# Patient Record
Sex: FEMALE | Race: WHITE | Employment: FULL TIME | URBAN - METROPOLITAN AREA
[De-identification: names, ages, dates, MRNs, and addresses within clinical notes are randomized per-mention and may not be internally consistent; named-entity substitution may affect disease eponyms.]

---

## 2018-08-17 ENCOUNTER — HOSPITAL ENCOUNTER (EMERGENCY)
Age: 63
Discharge: ANOTHER HOSPITAL | End: 2018-08-17
Attending: EMERGENCY MEDICINE
Payer: COMMERCIAL

## 2018-08-17 ENCOUNTER — APPOINTMENT (OUTPATIENT)
Dept: GENERAL RADIOLOGY | Age: 63
End: 2018-08-17
Payer: COMMERCIAL

## 2018-08-17 VITALS
BODY MASS INDEX: 25.58 KG/M2 | TEMPERATURE: 97.9 F | HEIGHT: 67 IN | OXYGEN SATURATION: 97 % | SYSTOLIC BLOOD PRESSURE: 116 MMHG | HEART RATE: 52 BPM | WEIGHT: 163 LBS | DIASTOLIC BLOOD PRESSURE: 80 MMHG | RESPIRATION RATE: 14 BRPM

## 2018-08-17 DIAGNOSIS — I20.0 UNSTABLE ANGINA (HCC): Primary | ICD-10-CM

## 2018-08-17 LAB
A/G RATIO: 1.8 (ref 1.1–2.2)
ALBUMIN SERPL-MCNC: 3.9 G/DL (ref 3.4–5)
ALP BLD-CCNC: 41 U/L (ref 40–129)
ALT SERPL-CCNC: 13 U/L (ref 10–40)
ANION GAP SERPL CALCULATED.3IONS-SCNC: 15 MMOL/L (ref 3–16)
AST SERPL-CCNC: 12 U/L (ref 15–37)
BASOPHILS ABSOLUTE: 0.1 K/UL (ref 0–0.2)
BASOPHILS RELATIVE PERCENT: 0.9 %
BILIRUB SERPL-MCNC: 0.3 MG/DL (ref 0–1)
BILIRUBIN URINE: NEGATIVE
BLOOD, URINE: NEGATIVE
BUN BLDV-MCNC: 25 MG/DL (ref 7–20)
CALCIUM SERPL-MCNC: 9.7 MG/DL (ref 8.3–10.6)
CHLORIDE BLD-SCNC: 103 MMOL/L (ref 99–110)
CLARITY: CLEAR
CO2: 21 MMOL/L (ref 21–32)
COLOR: YELLOW
CREAT SERPL-MCNC: 1.1 MG/DL (ref 0.6–1.2)
EKG ATRIAL RATE: 58 BPM
EKG DIAGNOSIS: NORMAL
EKG P AXIS: 14 DEGREES
EKG P-R INTERVAL: 128 MS
EKG Q-T INTERVAL: 442 MS
EKG QRS DURATION: 88 MS
EKG QTC CALCULATION (BAZETT): 433 MS
EKG R AXIS: 8 DEGREES
EKG T AXIS: 46 DEGREES
EKG VENTRICULAR RATE: 58 BPM
EOSINOPHILS ABSOLUTE: 0.2 K/UL (ref 0–0.6)
EOSINOPHILS RELATIVE PERCENT: 1.4 %
GFR AFRICAN AMERICAN: >60
GFR NON-AFRICAN AMERICAN: 50
GLOBULIN: 2.2 G/DL
GLUCOSE BLD-MCNC: 112 MG/DL (ref 70–99)
GLUCOSE URINE: NEGATIVE MG/DL
HCT VFR BLD CALC: 35.7 % (ref 36–48)
HEMOGLOBIN: 12.6 G/DL (ref 12–16)
KETONES, URINE: NEGATIVE MG/DL
LEUKOCYTE ESTERASE, URINE: NEGATIVE
LYMPHOCYTES ABSOLUTE: 3.2 K/UL (ref 1–5.1)
LYMPHOCYTES RELATIVE PERCENT: 28.8 %
MAGNESIUM: 1.7 MG/DL (ref 1.8–2.4)
MCH RBC QN AUTO: 32.4 PG (ref 26–34)
MCHC RBC AUTO-ENTMCNC: 35.3 G/DL (ref 31–36)
MCV RBC AUTO: 91.8 FL (ref 80–100)
MICROSCOPIC EXAMINATION: NORMAL
MONOCYTES ABSOLUTE: 0.6 K/UL (ref 0–1.3)
MONOCYTES RELATIVE PERCENT: 5.6 %
NEUTROPHILS ABSOLUTE: 7 K/UL (ref 1.7–7.7)
NEUTROPHILS RELATIVE PERCENT: 63.3 %
NITRITE, URINE: NEGATIVE
PDW BLD-RTO: 15 % (ref 12.4–15.4)
PH UA: 5.5
PLATELET # BLD: 173 K/UL (ref 135–450)
PMV BLD AUTO: 8.1 FL (ref 5–10.5)
POTASSIUM REFLEX MAGNESIUM: 3.4 MMOL/L (ref 3.5–5.1)
PRO-BNP: 669 PG/ML (ref 0–124)
PROTEIN UA: NEGATIVE MG/DL
RBC # BLD: 3.89 M/UL (ref 4–5.2)
SODIUM BLD-SCNC: 139 MMOL/L (ref 136–145)
SPECIFIC GRAVITY UA: 1.02
TOTAL PROTEIN: 6.1 G/DL (ref 6.4–8.2)
TROPONIN: <0.01 NG/ML
TROPONIN: <0.01 NG/ML
URINE REFLEX TO CULTURE: NORMAL
URINE TYPE: NORMAL
UROBILINOGEN, URINE: 0.2 E.U./DL
WBC # BLD: 11 K/UL (ref 4–11)

## 2018-08-17 PROCEDURE — 83735 ASSAY OF MAGNESIUM: CPT

## 2018-08-17 PROCEDURE — 99285 EMERGENCY DEPT VISIT HI MDM: CPT

## 2018-08-17 PROCEDURE — 83880 ASSAY OF NATRIURETIC PEPTIDE: CPT

## 2018-08-17 PROCEDURE — 93005 ELECTROCARDIOGRAM TRACING: CPT | Performed by: PHYSICIAN ASSISTANT

## 2018-08-17 PROCEDURE — 80053 COMPREHEN METABOLIC PANEL: CPT

## 2018-08-17 PROCEDURE — 6370000000 HC RX 637 (ALT 250 FOR IP)

## 2018-08-17 PROCEDURE — 84484 ASSAY OF TROPONIN QUANT: CPT

## 2018-08-17 PROCEDURE — 93010 ELECTROCARDIOGRAM REPORT: CPT | Performed by: INTERNAL MEDICINE

## 2018-08-17 PROCEDURE — 99285 EMERGENCY DEPT VISIT HI MDM: CPT | Performed by: INTERNAL MEDICINE

## 2018-08-17 PROCEDURE — 81003 URINALYSIS AUTO W/O SCOPE: CPT

## 2018-08-17 PROCEDURE — 6360000002 HC RX W HCPCS

## 2018-08-17 PROCEDURE — 85025 COMPLETE CBC W/AUTO DIFF WBC: CPT

## 2018-08-17 PROCEDURE — 96374 THER/PROPH/DIAG INJ IV PUSH: CPT

## 2018-08-17 PROCEDURE — 71046 X-RAY EXAM CHEST 2 VIEWS: CPT

## 2018-08-17 PROCEDURE — 6370000000 HC RX 637 (ALT 250 FOR IP): Performed by: EMERGENCY MEDICINE

## 2018-08-17 PROCEDURE — 96361 HYDRATE IV INFUSION ADD-ON: CPT

## 2018-08-17 PROCEDURE — 2580000003 HC RX 258

## 2018-08-17 RX ORDER — ASPIRIN 325 MG
325 TABLET ORAL ONCE
Status: DISCONTINUED | OUTPATIENT
Start: 2018-08-17 | End: 2018-08-17

## 2018-08-17 RX ORDER — POTASSIUM CHLORIDE 750 MG/1
TABLET, FILM COATED, EXTENDED RELEASE ORAL
Status: COMPLETED
Start: 2018-08-17 | End: 2018-08-17

## 2018-08-17 RX ORDER — 0.9 % SODIUM CHLORIDE 0.9 %
1000 INTRAVENOUS SOLUTION INTRAVENOUS ONCE
Status: COMPLETED | OUTPATIENT
Start: 2018-08-17 | End: 2018-08-17

## 2018-08-17 RX ORDER — DICYCLOMINE HCL 20 MG
20 TABLET ORAL
COMMUNITY
Start: 2017-09-08

## 2018-08-17 RX ORDER — ISOSORBIDE MONONITRATE 30 MG/1
30 TABLET, EXTENDED RELEASE ORAL
COMMUNITY
Start: 2018-05-04

## 2018-08-17 RX ORDER — SODIUM CHLORIDE 9 MG/ML
INJECTION, SOLUTION INTRAVENOUS
Status: COMPLETED
Start: 2018-08-17 | End: 2018-08-17

## 2018-08-17 RX ORDER — LOSARTAN POTASSIUM AND HYDROCHLOROTHIAZIDE 12.5; 5 MG/1; MG/1
TABLET ORAL
COMMUNITY
Start: 2018-08-14

## 2018-08-17 RX ORDER — PREDNISONE 1 MG/1
8 TABLET ORAL DAILY
COMMUNITY
Start: 2018-07-12

## 2018-08-17 RX ORDER — MORPHINE SULFATE 4 MG/ML
INJECTION, SOLUTION INTRAMUSCULAR; INTRAVENOUS
Status: COMPLETED
Start: 2018-08-17 | End: 2018-08-17

## 2018-08-17 RX ORDER — MESALAMINE 1.2 G/1
3600 TABLET, DELAYED RELEASE ORAL
COMMUNITY
Start: 2017-12-18

## 2018-08-17 RX ORDER — NITROGLYCERIN 0.4 MG/1
TABLET SUBLINGUAL
COMMUNITY
Start: 2018-02-13

## 2018-08-17 RX ORDER — TRAZODONE HYDROCHLORIDE 100 MG/1
TABLET ORAL
COMMUNITY
Start: 2018-02-22

## 2018-08-17 RX ORDER — METOPROLOL SUCCINATE 25 MG/1
25 TABLET, EXTENDED RELEASE ORAL
COMMUNITY
Start: 2018-03-16

## 2018-08-17 RX ORDER — POTASSIUM CHLORIDE 750 MG/1
10 TABLET, FILM COATED, EXTENDED RELEASE ORAL ONCE
Status: COMPLETED | OUTPATIENT
Start: 2018-08-17 | End: 2018-08-17

## 2018-08-17 RX ORDER — MORPHINE SULFATE 4 MG/ML
4 INJECTION, SOLUTION INTRAMUSCULAR; INTRAVENOUS ONCE
Status: COMPLETED | OUTPATIENT
Start: 2018-08-17 | End: 2018-08-17

## 2018-08-17 RX ORDER — LOPERAMIDE HYDROCHLORIDE 2 MG/1
6 CAPSULE ORAL
COMMUNITY

## 2018-08-17 RX ADMIN — POTASSIUM CHLORIDE 10 MEQ: 750 TABLET, FILM COATED, EXTENDED RELEASE ORAL at 13:14

## 2018-08-17 RX ADMIN — TICAGRELOR 90 MG: 90 TABLET ORAL at 14:15

## 2018-08-17 RX ADMIN — NITROGLYCERIN 0.5 INCH: 20 OINTMENT TOPICAL at 12:22

## 2018-08-17 RX ADMIN — POTASSIUM CHLORIDE 10 MEQ: 750 TABLET, EXTENDED RELEASE ORAL at 13:14

## 2018-08-17 RX ADMIN — MORPHINE SULFATE 4 MG: 4 INJECTION, SOLUTION INTRAMUSCULAR; INTRAVENOUS at 12:22

## 2018-08-17 RX ADMIN — MORPHINE SULFATE 4 MG: 4 INJECTION INTRAVENOUS at 12:22

## 2018-08-17 RX ADMIN — Medication 1000 ML: at 12:21

## 2018-08-17 RX ADMIN — SODIUM CHLORIDE 1000 ML: 9 INJECTION, SOLUTION INTRAVENOUS at 12:21

## 2018-08-17 ASSESSMENT — PAIN SCALES - GENERAL
PAINLEVEL_OUTOF10: 7
PAINLEVEL_OUTOF10: 0
PAINLEVEL_OUTOF10: 7

## 2018-08-17 ASSESSMENT — PAIN DESCRIPTION - LOCATION: LOCATION: CHEST

## 2018-08-17 ASSESSMENT — PAIN DESCRIPTION - ORIENTATION: ORIENTATION: MID

## 2018-08-17 ASSESSMENT — PAIN DESCRIPTION - PAIN TYPE: TYPE: ACUTE PAIN

## 2018-08-17 ASSESSMENT — PAIN DESCRIPTION - DESCRIPTORS: DESCRIPTORS: PRESSURE

## 2018-08-17 NOTE — ED NOTES
Report called to ALLIE Sandoval on [facility] at [facility]. Questions/concerns addressed at this time. VS as noted. Report given to transporting ambulance. Pt remains pain free at this time. Pt denies any questions/concerns at this time. Chart sent with transporting crew at this time.       Zelda Ferreira RN  08/17/18 9945

## 2018-08-17 NOTE — ED PROVIDER NOTES
reflex Magnesium 3.4 (L) 3.5 - 5.1 mmol/L    Chloride 103 99 - 110 mmol/L    CO2 21 21 - 32 mmol/L    Anion Gap 15 3 - 16    Glucose 112 (H) 70 - 99 mg/dL    BUN 25 (H) 7 - 20 mg/dL    CREATININE 1.1 0.6 - 1.2 mg/dL    GFR Non-African American 50 (A) >60    GFR African American >60 >60    Calcium 9.7 8.3 - 10.6 mg/dL    Total Protein 6.1 (L) 6.4 - 8.2 g/dL    Alb 3.9 3.4 - 5.0 g/dL    Albumin/Globulin Ratio 1.8 1.1 - 2.2    Total Bilirubin 0.3 0.0 - 1.0 mg/dL    Alkaline Phosphatase 41 40 - 129 U/L    ALT 13 10 - 40 U/L    AST 12 (L) 15 - 37 U/L    Globulin 2.2 g/dL   Troponin   Result Value Ref Range    Troponin <0.01 <0.01 ng/mL   Brain natriuretic peptide   Result Value Ref Range    Pro- (H) 0 - 124 pg/mL   Urine Reflex to Culture   Result Value Ref Range    Color, UA Yellow Straw/Yellow    Clarity, UA Clear Clear    Glucose, Ur Negative Negative mg/dL    Bilirubin Urine Negative Negative    Ketones, Urine Negative Negative mg/dL    Specific Gravity, UA 1.020 1.005 - 1.030    Blood, Urine Negative Negative    pH, UA 5.5 5.0 - 8.0    Protein, UA Negative Negative mg/dL    Urobilinogen, Urine 0.2 <2.0 E.U./dL    Nitrite, Urine Negative Negative    Leukocyte Esterase, Urine Negative Negative    Microscopic Examination Not Indicated     Urine Reflex to Culture Not Indicated     Urine Type Not Specified    Magnesium   Result Value Ref Range    Magnesium 1.70 (L) 1.80 - 2.40 mg/dL   Troponin   Result Value Ref Range    Troponin <0.01 <0.01 ng/mL   EKG 12 Lead   Result Value Ref Range    Ventricular Rate 58 BPM    Atrial Rate 58 BPM    P-R Interval 128 ms    QRS Duration 88 ms    Q-T Interval 442 ms    QTc Calculation (Bazett) 433 ms    P Axis 14 degrees    R Axis 8 degrees    T Axis 46 degrees    Diagnosis       Sinus bradycardiaNonspecific ST abnormalityAbnormal ECGConfirmed by Reynaldo Verduzco MD, Anais Wiseman ([id number]) on 8/17/2018 6:04:40 PM       Imaging:   All Radiology results interpreted by Radiologist unless examination  This patient has remained hemodynamically stable during their ED course. Plan   Transfer to [address] [place] [address] to telemetry, at pt's request, due to insurance. Patient condition is stable. New Medications     Discharge Medication List as of 8/17/2018  6:20 PM        Electronically signed by Orlando Bello PA-C   DD: 8/17/18  **This report was transcribed using voice recognition software. Every effort was made to ensure accuracy; however, inadvertent computerized transcription errors may be present.   END OF PROVIDER NOTE      Orlando Bello PA-C  08/17/18 0559

## 2018-08-17 NOTE — CONSULTS
[address]                                   CONSULTATION    PATIENT NAME: Brice Magdaleno                    :        1955  MED REC NO:   [id number]                          ROOM:       21  ACCOUNT NO:   [de-identified]                           ADMIT DATE: 2018  PROVIDER:     Britni Hinkle MD    CONSULT DATE:  2018    REASON FOR CONSULTATION:  Chest pain. HISTORY OF PRESENT ILLNESS:  A 34-year-old female patient with coronary  artery disease, presented to the hospital with complaints of chest pain. She was driving a car when she had sudden onset of chest pain, felt a  tightness in the center of her chest, radiated up into her neck and her  jaw. It lasted for almost an hour. She got partial relief after taking  three sublingual nitroglycerins. She states it was similar to her prior  angina, but more severe. This is the first occurrence she has had since  her angioplasty in 2018. There was no precipitating factor. The pain  is now completely resolved. PAST MEDICAL HISTORY:  1. Coronary artery disease status post angioplasty with stenting with Dr. Dung Tiwari at [facility] DR RONAK HEBERT in 2018. 2.  Hypertension. 3.  Hyperlipidemia. SOCIAL HISTORY:  She is . She works as a nurse at [employer].  She is a former smoker. FAMILY HISTORY:  Positive for heart disease. REVIEW OF SYSTEMS:  She denies fevers, chills, cough. No focal  neurological symptoms. No headache. No visual change. No recent GI or   bleeding. No recent or upcoming surgeries. All other systems are negative  except as present illness. ALLERGIES:  No known drug allergies. MEDICATIONS:  See list in the chart, which I have reviewed. PHYSICAL EXAMINATION:  VITALS:  Blood pressure 134/78, heart rate 59, respirations 20, temperature  97.9.   She is 100% saturating on room

## 2018-08-17 NOTE — PROGRESS NOTES
Called  transfer center to contact [facility] cardiology @[phone]  Per Daniel Matthews returned call @[phone]

## 2023-11-07 ENCOUNTER — HOSPITAL ENCOUNTER (OUTPATIENT)
Age: 68
Discharge: HOME | End: 2023-11-07

## 2023-11-07 ENCOUNTER — HOSPITAL ENCOUNTER (OUTPATIENT)
Dept: GENERAL RADIOLOGY | Age: 68
Discharge: HOME | End: 2023-11-07

## 2023-11-07 DIAGNOSIS — R05.1 ACUTE COUGH: ICD-10-CM

## 2023-11-07 PROCEDURE — 71046 X-RAY EXAM CHEST 2 VIEWS: CPT

## 2023-11-24 ENCOUNTER — APPOINTMENT (OUTPATIENT)
Dept: GENERAL RADIOLOGY | Age: 68
DRG: 872 | End: 2023-11-24
Payer: MEDICARE

## 2023-11-24 ENCOUNTER — APPOINTMENT (OUTPATIENT)
Dept: CT IMAGING | Age: 68
DRG: 872 | End: 2023-11-24
Payer: MEDICARE

## 2023-11-24 ENCOUNTER — HOSPITAL ENCOUNTER (INPATIENT)
Age: 68
LOS: 4 days | Discharge: HOME | DRG: 872 | End: 2023-11-28
Attending: STUDENT IN AN ORGANIZED HEALTH CARE EDUCATION/TRAINING PROGRAM | Admitting: STUDENT IN AN ORGANIZED HEALTH CARE EDUCATION/TRAINING PROGRAM
Payer: MEDICARE

## 2023-11-24 DIAGNOSIS — E83.42 HYPOMAGNESEMIA: ICD-10-CM

## 2023-11-24 DIAGNOSIS — N30.01 ACUTE CYSTITIS WITH HEMATURIA: ICD-10-CM

## 2023-11-24 DIAGNOSIS — A41.9 SEPTICEMIA (HCC): Primary | ICD-10-CM

## 2023-11-24 LAB
ALBUMIN SERPL-MCNC: 3.3 G/DL (ref 3.4–5)
ALBUMIN/GLOB SERPL: 1.5 {RATIO} (ref 1.1–2.2)
ALP SERPL-CCNC: 39 U/L (ref 40–129)
ALT SERPL-CCNC: 28 U/L (ref 10–40)
ANION GAP SERPL CALCULATED.3IONS-SCNC: 13 MMOL/L (ref 3–16)
AST SERPL-CCNC: 22 U/L (ref 15–37)
BACTERIA URNS QL MICRO: ABNORMAL /HPF
BASOPHILS # BLD: 0 K/UL (ref 0–0.2)
BASOPHILS NFR BLD: 0.2 %
BILIRUB SERPL-MCNC: 0.7 MG/DL (ref 0–1)
BILIRUB UR QL STRIP.AUTO: ABNORMAL
BUN SERPL-MCNC: 28 MG/DL (ref 7–20)
CALCIUM SERPL-MCNC: 8.4 MG/DL (ref 8.3–10.6)
CHLORIDE SERPL-SCNC: 104 MMOL/L (ref 99–110)
CLARITY UR: ABNORMAL
CO2 SERPL-SCNC: 21 MMOL/L (ref 21–32)
COLOR UR: YELLOW
CREAT SERPL-MCNC: 2 MG/DL (ref 0.6–1.2)
D DIMER: 2.74 UG/ML FEU (ref 0–0.6)
DEPRECATED RDW RBC AUTO: 13.4 % (ref 12.4–15.4)
EKG ATRIAL RATE: 76 BPM
EKG DIAGNOSIS: NORMAL
EKG P AXIS: 31 DEGREES
EKG P-R INTERVAL: 126 MS
EKG Q-T INTERVAL: 422 MS
EKG QRS DURATION: 88 MS
EKG QTC CALCULATION (BAZETT): 474 MS
EKG R AXIS: -4 DEGREES
EKG T AXIS: 71 DEGREES
EKG VENTRICULAR RATE: 76 BPM
EOSINOPHIL # BLD: 0.2 K/UL (ref 0–0.6)
EOSINOPHIL NFR BLD: 1 %
FLUAV RNA RESP QL NAA+PROBE: NOT DETECTED
FLUBV RNA RESP QL NAA+PROBE: NOT DETECTED
GFR SERPLBLD CREATININE-BSD FMLA CKD-EPI: 26 ML/MIN/{1.73_M2}
GLUCOSE SERPL-MCNC: 143 MG/DL (ref 70–99)
GLUCOSE UR STRIP.AUTO-MCNC: NEGATIVE MG/DL
HCT VFR BLD AUTO: 40.6 % (ref 36–48)
HGB BLD-MCNC: 13.4 G/DL (ref 12–16)
HGB UR QL STRIP.AUTO: ABNORMAL
KETONES UR STRIP.AUTO-MCNC: NEGATIVE MG/DL
LACTATE BLDV-SCNC: 2.9 MMOL/L (ref 0.4–1.9)
LACTATE BLDV-SCNC: 3.3 MMOL/L (ref 0.4–1.9)
LEUKOCYTE ESTERASE UR QL STRIP.AUTO: ABNORMAL
LYMPHOCYTES # BLD: 1.7 K/UL (ref 1–5.1)
LYMPHOCYTES NFR BLD: 6.6 %
MAGNESIUM SERPL-MCNC: 1.7 MG/DL (ref 1.8–2.4)
MCH RBC QN AUTO: 29.9 PG (ref 26–34)
MCHC RBC AUTO-ENTMCNC: 33 G/DL (ref 31–36)
MCV RBC AUTO: 90.6 FL (ref 80–100)
MONOCYTES # BLD: 1.2 K/UL (ref 0–1.3)
MONOCYTES NFR BLD: 4.7 %
NEUTROPHILS # BLD: 22.5 K/UL (ref 1.7–7.7)
NEUTROPHILS NFR BLD: 87.5 %
NITRITE UR QL STRIP.AUTO: POSITIVE
PH UR STRIP.AUTO: 6 [PH] (ref 5–8)
PLATELET # BLD AUTO: 88 K/UL (ref 135–450)
PLATELET BLD QL SMEAR: ABNORMAL
PMV BLD AUTO: 9.2 FL (ref 5–10.5)
POTASSIUM SERPL-SCNC: 3.4 MMOL/L (ref 3.5–5.1)
PROT SERPL-MCNC: 5.5 G/DL (ref 6.4–8.2)
PROT UR STRIP.AUTO-MCNC: >=300 MG/DL
RBC # BLD AUTO: 4.48 M/UL (ref 4–5.2)
RBC #/AREA URNS HPF: ABNORMAL /HPF (ref 0–4)
SARS-COV-2 RNA RESP QL NAA+PROBE: NOT DETECTED
SLIDE REVIEW: ABNORMAL
SODIUM SERPL-SCNC: 138 MMOL/L (ref 136–145)
SP GR UR STRIP.AUTO: 1.02 (ref 1–1.03)
TROPONIN, HIGH SENSITIVITY: 54 NG/L (ref 0–14)
TROPONIN, HIGH SENSITIVITY: 66 NG/L (ref 0–14)
UA COMPLETE W REFLEX CULTURE PNL UR: YES
UA DIPSTICK W REFLEX MICRO PNL UR: YES
URN SPEC COLLECT METH UR: ABNORMAL
UROBILINOGEN UR STRIP-ACNC: 0.2 E.U./DL
WBC # BLD AUTO: 25.7 K/UL (ref 4–11)
WBC #/AREA URNS HPF: >100 /HPF (ref 0–5)

## 2023-11-24 PROCEDURE — 85379 FIBRIN DEGRADATION QUANT: CPT

## 2023-11-24 PROCEDURE — 87040 BLOOD CULTURE FOR BACTERIA: CPT

## 2023-11-24 PROCEDURE — 93010 ELECTROCARDIOGRAM REPORT: CPT | Performed by: INTERNAL MEDICINE

## 2023-11-24 PROCEDURE — 87186 SC STD MICRODIL/AGAR DIL: CPT

## 2023-11-24 PROCEDURE — 96361 HYDRATE IV INFUSION ADD-ON: CPT

## 2023-11-24 PROCEDURE — 87636 SARSCOV2 & INF A&B AMP PRB: CPT

## 2023-11-24 PROCEDURE — 87077 CULTURE AEROBIC IDENTIFY: CPT

## 2023-11-24 PROCEDURE — 71046 X-RAY EXAM CHEST 2 VIEWS: CPT

## 2023-11-24 PROCEDURE — 99285 EMERGENCY DEPT VISIT HI MDM: CPT

## 2023-11-24 PROCEDURE — 81001 URINALYSIS AUTO W/SCOPE: CPT

## 2023-11-24 PROCEDURE — 2580000003 HC RX 258: Performed by: PHYSICIAN ASSISTANT

## 2023-11-24 PROCEDURE — 83735 ASSAY OF MAGNESIUM: CPT

## 2023-11-24 PROCEDURE — 87086 URINE CULTURE/COLONY COUNT: CPT

## 2023-11-24 PROCEDURE — 6370000000 HC RX 637 (ALT 250 FOR IP): Performed by: STUDENT IN AN ORGANIZED HEALTH CARE EDUCATION/TRAINING PROGRAM

## 2023-11-24 PROCEDURE — 83605 ASSAY OF LACTIC ACID: CPT

## 2023-11-24 PROCEDURE — 96367 TX/PROPH/DG ADDL SEQ IV INF: CPT

## 2023-11-24 PROCEDURE — 6370000000 HC RX 637 (ALT 250 FOR IP): Performed by: PHYSICIAN ASSISTANT

## 2023-11-24 PROCEDURE — 93005 ELECTROCARDIOGRAM TRACING: CPT | Performed by: STUDENT IN AN ORGANIZED HEALTH CARE EDUCATION/TRAINING PROGRAM

## 2023-11-24 PROCEDURE — 71250 CT THORAX DX C-: CPT

## 2023-11-24 PROCEDURE — 1200000000 HC SEMI PRIVATE

## 2023-11-24 PROCEDURE — 85025 COMPLETE CBC W/AUTO DIFF WBC: CPT

## 2023-11-24 PROCEDURE — 96365 THER/PROPH/DIAG IV INF INIT: CPT

## 2023-11-24 PROCEDURE — 84484 ASSAY OF TROPONIN QUANT: CPT

## 2023-11-24 PROCEDURE — 87150 DNA/RNA AMPLIFIED PROBE: CPT

## 2023-11-24 PROCEDURE — 6360000002 HC RX W HCPCS: Performed by: PHYSICIAN ASSISTANT

## 2023-11-24 PROCEDURE — 80053 COMPREHEN METABOLIC PANEL: CPT

## 2023-11-24 RX ORDER — ACETAMINOPHEN 650 MG/1
650 SUPPOSITORY RECTAL EVERY 6 HOURS PRN
Status: CANCELLED | OUTPATIENT
Start: 2023-11-24

## 2023-11-24 RX ORDER — HYDROMORPHONE HYDROCHLORIDE 1 MG/ML
0.5 INJECTION, SOLUTION INTRAMUSCULAR; INTRAVENOUS; SUBCUTANEOUS EVERY 4 HOURS PRN
Status: DISCONTINUED | OUTPATIENT
Start: 2023-11-24 | End: 2023-11-27

## 2023-11-24 RX ORDER — ACETAMINOPHEN 500 MG
1000 TABLET ORAL ONCE
Status: COMPLETED | OUTPATIENT
Start: 2023-11-24 | End: 2023-11-24

## 2023-11-24 RX ORDER — ACETAMINOPHEN 500 MG
1000 TABLET ORAL EVERY 6 HOURS PRN
Status: DISCONTINUED | OUTPATIENT
Start: 2023-11-25 | End: 2023-11-28 | Stop reason: HOSPADM

## 2023-11-24 RX ORDER — 0.9 % SODIUM CHLORIDE 0.9 %
30 INTRAVENOUS SOLUTION INTRAVENOUS ONCE
Status: COMPLETED | OUTPATIENT
Start: 2023-11-24 | End: 2023-11-24

## 2023-11-24 RX ORDER — HYDROCODONE BITARTRATE AND ACETAMINOPHEN 7.5; 325 MG/1; MG/1
1 TABLET ORAL ONCE
Status: COMPLETED | OUTPATIENT
Start: 2023-11-24 | End: 2023-11-24

## 2023-11-24 RX ORDER — 0.9 % SODIUM CHLORIDE 0.9 %
1000 INTRAVENOUS SOLUTION INTRAVENOUS ONCE
Status: COMPLETED | OUTPATIENT
Start: 2023-11-24 | End: 2023-11-24

## 2023-11-24 RX ORDER — MAGNESIUM SULFATE 1 G/100ML
1000 INJECTION INTRAVENOUS ONCE
Status: COMPLETED | OUTPATIENT
Start: 2023-11-24 | End: 2023-11-24

## 2023-11-24 RX ORDER — ACETAMINOPHEN 325 MG/1
650 TABLET ORAL EVERY 6 HOURS PRN
Status: CANCELLED | OUTPATIENT
Start: 2023-11-24

## 2023-11-24 RX ADMIN — HYDROCODONE BITARTRATE AND ACETAMINOPHEN 1 TABLET: 7.5; 325 TABLET ORAL at 22:59

## 2023-11-24 RX ADMIN — MAGNESIUM SULFATE IN DEXTROSE 1000 MG: 10 INJECTION, SOLUTION INTRAVENOUS at 21:03

## 2023-11-24 RX ADMIN — SODIUM CHLORIDE 1000 ML: 9 INJECTION, SOLUTION INTRAVENOUS at 16:59

## 2023-11-24 RX ADMIN — SODIUM CHLORIDE 1992 ML: 9 INJECTION, SOLUTION INTRAVENOUS at 18:30

## 2023-11-24 RX ADMIN — CEFTRIAXONE SODIUM 1000 MG: 1 INJECTION, POWDER, FOR SOLUTION INTRAMUSCULAR; INTRAVENOUS at 19:27

## 2023-11-24 RX ADMIN — ACETAMINOPHEN 1000 MG: 500 TABLET ORAL at 20:02

## 2023-11-24 ASSESSMENT — PAIN DESCRIPTION - LOCATION
LOCATION: BACK
LOCATION: GENERALIZED
LOCATION: GENERALIZED

## 2023-11-24 ASSESSMENT — PAIN - FUNCTIONAL ASSESSMENT
PAIN_FUNCTIONAL_ASSESSMENT: NONE - DENIES PAIN
PAIN_FUNCTIONAL_ASSESSMENT: 0-10

## 2023-11-24 ASSESSMENT — PAIN SCALES - GENERAL
PAINLEVEL_OUTOF10: 7
PAINLEVEL_OUTOF10: 4
PAINLEVEL_OUTOF10: 4

## 2023-11-24 ASSESSMENT — PAIN DESCRIPTION - PAIN TYPE
TYPE: ACUTE PAIN
TYPE: ACUTE PAIN

## 2023-11-24 NOTE — ED PROVIDER NOTES
[address] [place] [address]  ED  EMERGENCY DEPARTMENT ENCOUNTER        Pt Name: Deidre Becerra  MRN: [id number]  [facility] 1955  Date of evaluation: 11/24/2023  Provider: KIMBERLYN Mccrary  PCP: Beronica Padilla  Note Started: 4:31 PM EST 11/24/23       I have seen and evaluated this patient with my supervising physician Forrest Ward MD.      [address]       Chief Complaint   Patient presents with    Dizziness     Near syncopal episode at home. States fever at home. Given duoneb and 125 solumedrol PTA    Fever       HISTORY OF PRESENT ILLNESS: 1 or more Elements     History from : Patient    Limitations to history : None    Deidre Becerra is a 70 y.o. female who presents to the emergency department for fever, cough, near syncopal episode. The patient states for the past 6 weeks she has had a persistent cough. She has had a chest x-ray by her primary care physician which has been negative. She has been treated with 2 rounds of antibiotics without significant relief of symptoms. Patient states today she was coughing so hard she felt like she was going to pass out. She has been generally weak over the past 24 hours unable to get out of bed. She has had a fever as high as 103. Fever is new as of last night. She also is reporting flank pain which she states she has previously had with urinary tract infection. She denies any chest pain or shortness of breath. Her cough is nonproductive. She does complain of some generalized abdominal pain. She states she has not eaten or drank anything for the past 2 days due to not feeling well. No vomiting. She was given a breathing treatment in route by EMS which helped to alleviate some of her coughing and shortness of breath. Nursing Notes were all reviewed and agreed with or any disagreements were addressed in the HPI. REVIEW OF SYSTEMS :      Review of Systems    Positives and Pertinent negatives as per HPI.      SURGICAL HISTORY     Past

## 2023-11-24 NOTE — ED NOTES
Pt arrives via EMS for near syncopal episode today where  caught pt before she hit the ground. Pt states a fever and some L sided back pain since last night. Pt states some nausea and dizziness. Pt states on going cough for about 6 wks, however, pt denies SOB. Pt alert and oriented at time of arrival. See flowsheets for vital signs.       Laurie Marsh RN  11/24/23 3417

## 2023-11-25 LAB
ALBUMIN SERPL-MCNC: 3.2 G/DL (ref 3.4–5)
ALBUMIN/GLOB SERPL: 1.1 {RATIO} (ref 1.1–2.2)
ALP SERPL-CCNC: 53 U/L (ref 40–129)
ALT SERPL-CCNC: 26 U/L (ref 10–40)
ANION GAP SERPL CALCULATED.3IONS-SCNC: 14 MMOL/L (ref 3–16)
AST SERPL-CCNC: 22 U/L (ref 15–37)
BASOPHILS # BLD: 0 K/UL (ref 0–0.2)
BASOPHILS NFR BLD: 0.1 %
BILIRUB SERPL-MCNC: 0.5 MG/DL (ref 0–1)
BUN SERPL-MCNC: 28 MG/DL (ref 7–20)
CALCIUM SERPL-MCNC: 8.9 MG/DL (ref 8.3–10.6)
CHLORIDE SERPL-SCNC: 109 MMOL/L (ref 99–110)
CO2 SERPL-SCNC: 19 MMOL/L (ref 21–32)
CREAT SERPL-MCNC: 1.5 MG/DL (ref 0.6–1.2)
DEPRECATED RDW RBC AUTO: 13.6 % (ref 12.4–15.4)
EOSINOPHIL # BLD: 0 K/UL (ref 0–0.6)
EOSINOPHIL NFR BLD: 0 %
GFR SERPLBLD CREATININE-BSD FMLA CKD-EPI: 37 ML/MIN/{1.73_M2}
GLUCOSE SERPL-MCNC: 174 MG/DL (ref 70–99)
HCT VFR BLD AUTO: 39.7 % (ref 36–48)
HGB BLD-MCNC: 13.2 G/DL (ref 12–16)
LYMPHOCYTES # BLD: 1.4 K/UL (ref 1–5.1)
LYMPHOCYTES NFR BLD: 4.4 %
MCH RBC QN AUTO: 29.9 PG (ref 26–34)
MCHC RBC AUTO-ENTMCNC: 33.2 G/DL (ref 31–36)
MCV RBC AUTO: 89.9 FL (ref 80–100)
MONOCYTES # BLD: 0.4 K/UL (ref 0–1.3)
MONOCYTES NFR BLD: 1.3 %
NEUTROPHILS # BLD: 30.3 K/UL (ref 1.7–7.7)
NEUTROPHILS NFR BLD: 94.2 %
PLATELET # BLD AUTO: 87 K/UL (ref 135–450)
PMV BLD AUTO: 10.1 FL (ref 5–10.5)
POTASSIUM SERPL-SCNC: 3.8 MMOL/L (ref 3.5–5.1)
PROT SERPL-MCNC: 6 G/DL (ref 6.4–8.2)
RBC # BLD AUTO: 4.42 M/UL (ref 4–5.2)
REPORT: NORMAL
SODIUM SERPL-SCNC: 142 MMOL/L (ref 136–145)
WBC # BLD AUTO: 32.2 K/UL (ref 4–11)

## 2023-11-25 PROCEDURE — 2580000003 HC RX 258: Performed by: HOSPITALIST

## 2023-11-25 PROCEDURE — 6370000000 HC RX 637 (ALT 250 FOR IP): Performed by: HOSPITALIST

## 2023-11-25 PROCEDURE — 6370000000 HC RX 637 (ALT 250 FOR IP): Performed by: STUDENT IN AN ORGANIZED HEALTH CARE EDUCATION/TRAINING PROGRAM

## 2023-11-25 PROCEDURE — 6360000002 HC RX W HCPCS: Performed by: HOSPITALIST

## 2023-11-25 PROCEDURE — 36415 COLL VENOUS BLD VENIPUNCTURE: CPT

## 2023-11-25 PROCEDURE — 2580000003 HC RX 258: Performed by: STUDENT IN AN ORGANIZED HEALTH CARE EDUCATION/TRAINING PROGRAM

## 2023-11-25 PROCEDURE — 1200000000 HC SEMI PRIVATE

## 2023-11-25 PROCEDURE — 94640 AIRWAY INHALATION TREATMENT: CPT

## 2023-11-25 PROCEDURE — 93005 ELECTROCARDIOGRAM TRACING: CPT | Performed by: HOSPITALIST

## 2023-11-25 PROCEDURE — 80053 COMPREHEN METABOLIC PANEL: CPT

## 2023-11-25 PROCEDURE — 6360000002 HC RX W HCPCS: Performed by: STUDENT IN AN ORGANIZED HEALTH CARE EDUCATION/TRAINING PROGRAM

## 2023-11-25 PROCEDURE — 85025 COMPLETE CBC W/AUTO DIFF WBC: CPT

## 2023-11-25 RX ORDER — ASPIRIN 81 MG/1
81 TABLET ORAL DAILY
Status: DISCONTINUED | OUTPATIENT
Start: 2023-11-25 | End: 2023-11-28 | Stop reason: HOSPADM

## 2023-11-25 RX ORDER — SODIUM CHLORIDE 0.9 % (FLUSH) 0.9 %
5-40 SYRINGE (ML) INJECTION EVERY 12 HOURS SCHEDULED
Status: DISCONTINUED | OUTPATIENT
Start: 2023-11-25 | End: 2023-11-28 | Stop reason: HOSPADM

## 2023-11-25 RX ORDER — ISOSORBIDE MONONITRATE 30 MG/1
15 TABLET, EXTENDED RELEASE ORAL DAILY
Status: DISCONTINUED | OUTPATIENT
Start: 2023-11-25 | End: 2023-11-28 | Stop reason: HOSPADM

## 2023-11-25 RX ORDER — ALBUTEROL SULFATE 2.5 MG/3ML
2.5 SOLUTION RESPIRATORY (INHALATION)
Status: DISCONTINUED | OUTPATIENT
Start: 2023-11-25 | End: 2023-11-27

## 2023-11-25 RX ORDER — METOPROLOL SUCCINATE 25 MG/1
12.5 TABLET, EXTENDED RELEASE ORAL DAILY
Status: DISCONTINUED | OUTPATIENT
Start: 2023-11-25 | End: 2023-11-26

## 2023-11-25 RX ORDER — TRAZODONE HYDROCHLORIDE 50 MG/1
100 TABLET ORAL NIGHTLY
Status: DISCONTINUED | OUTPATIENT
Start: 2023-11-25 | End: 2023-11-28 | Stop reason: HOSPADM

## 2023-11-25 RX ORDER — ONDANSETRON 2 MG/ML
4 INJECTION INTRAMUSCULAR; INTRAVENOUS EVERY 6 HOURS PRN
Status: DISCONTINUED | OUTPATIENT
Start: 2023-11-25 | End: 2023-11-27

## 2023-11-25 RX ORDER — ENOXAPARIN SODIUM 100 MG/ML
30 INJECTION SUBCUTANEOUS DAILY
Status: DISCONTINUED | OUTPATIENT
Start: 2023-11-25 | End: 2023-11-25

## 2023-11-25 RX ORDER — EZETIMIBE 10 MG/1
10 TABLET ORAL DAILY
COMMUNITY

## 2023-11-25 RX ORDER — HYDRALAZINE HYDROCHLORIDE 10 MG/1
10 TABLET, FILM COATED ORAL EVERY 8 HOURS PRN
Status: DISCONTINUED | OUTPATIENT
Start: 2023-11-25 | End: 2023-11-28 | Stop reason: HOSPADM

## 2023-11-25 RX ORDER — SODIUM CHLORIDE 9 MG/ML
INJECTION, SOLUTION INTRAVENOUS CONTINUOUS
Status: DISCONTINUED | OUTPATIENT
Start: 2023-11-25 | End: 2023-11-27

## 2023-11-25 RX ORDER — ROSUVASTATIN CALCIUM 40 MG/1
40 TABLET, COATED ORAL EVERY EVENING
COMMUNITY

## 2023-11-25 RX ORDER — POLYETHYLENE GLYCOL 3350 17 G/17G
17 POWDER, FOR SOLUTION ORAL DAILY PRN
Status: DISCONTINUED | OUTPATIENT
Start: 2023-11-25 | End: 2023-11-28 | Stop reason: HOSPADM

## 2023-11-25 RX ORDER — ONDANSETRON 4 MG/1
4 TABLET, ORALLY DISINTEGRATING ORAL EVERY 8 HOURS PRN
Status: DISCONTINUED | OUTPATIENT
Start: 2023-11-25 | End: 2023-11-27

## 2023-11-25 RX ORDER — SODIUM CHLORIDE 9 MG/ML
INJECTION, SOLUTION INTRAVENOUS PRN
Status: DISCONTINUED | OUTPATIENT
Start: 2023-11-25 | End: 2023-11-28 | Stop reason: HOSPADM

## 2023-11-25 RX ORDER — GUAIFENESIN 600 MG/1
600 TABLET, EXTENDED RELEASE ORAL 2 TIMES DAILY
Status: DISCONTINUED | OUTPATIENT
Start: 2023-11-25 | End: 2023-11-28 | Stop reason: HOSPADM

## 2023-11-25 RX ORDER — BENZONATATE 100 MG/1
100 CAPSULE ORAL 3 TIMES DAILY PRN
Status: DISCONTINUED | OUTPATIENT
Start: 2023-11-25 | End: 2023-11-27

## 2023-11-25 RX ORDER — ALBUTEROL SULFATE 2.5 MG/3ML
2.5 SOLUTION RESPIRATORY (INHALATION) EVERY 6 HOURS PRN
Status: DISCONTINUED | OUTPATIENT
Start: 2023-11-25 | End: 2023-11-28 | Stop reason: HOSPADM

## 2023-11-25 RX ORDER — SODIUM CHLORIDE 0.9 % (FLUSH) 0.9 %
5-40 SYRINGE (ML) INJECTION PRN
Status: DISCONTINUED | OUTPATIENT
Start: 2023-11-25 | End: 2023-11-28 | Stop reason: HOSPADM

## 2023-11-25 RX ADMIN — METOPROLOL SUCCINATE 12.5 MG: 25 TABLET, EXTENDED RELEASE ORAL at 08:36

## 2023-11-25 RX ADMIN — Medication 10 ML: at 20:13

## 2023-11-25 RX ADMIN — SODIUM CHLORIDE: 9 INJECTION, SOLUTION INTRAVENOUS at 18:30

## 2023-11-25 RX ADMIN — GUAIFENESIN 600 MG: 600 TABLET ORAL at 12:21

## 2023-11-25 RX ADMIN — SODIUM CHLORIDE 25 ML: 9 INJECTION, SOLUTION INTRAVENOUS at 15:35

## 2023-11-25 RX ADMIN — BENZONATATE 100 MG: 100 CAPSULE ORAL at 10:50

## 2023-11-25 RX ADMIN — HYDRALAZINE HYDROCHLORIDE 10 MG: 10 TABLET, FILM COATED ORAL at 18:26

## 2023-11-25 RX ADMIN — TRAZODONE HYDROCHLORIDE 100 MG: 50 TABLET ORAL at 01:16

## 2023-11-25 RX ADMIN — ALBUTEROL SULFATE 2.5 MG: 2.5 SOLUTION RESPIRATORY (INHALATION) at 20:21

## 2023-11-25 RX ADMIN — ALBUTEROL SULFATE 2.5 MG: 2.5 SOLUTION RESPIRATORY (INHALATION) at 12:44

## 2023-11-25 RX ADMIN — ENOXAPARIN SODIUM 30 MG: 100 INJECTION SUBCUTANEOUS at 08:37

## 2023-11-25 RX ADMIN — ISOSORBIDE MONONITRATE 15 MG: 30 TABLET, EXTENDED RELEASE ORAL at 08:35

## 2023-11-25 RX ADMIN — HYDRALAZINE HYDROCHLORIDE 10 MG: 10 TABLET, FILM COATED ORAL at 22:04

## 2023-11-25 RX ADMIN — GUAIFENESIN 600 MG: 600 TABLET ORAL at 20:13

## 2023-11-25 RX ADMIN — HYDROMORPHONE HYDROCHLORIDE 0.5 MG: 1 INJECTION, SOLUTION INTRAMUSCULAR; INTRAVENOUS; SUBCUTANEOUS at 22:29

## 2023-11-25 RX ADMIN — SODIUM CHLORIDE: 9 INJECTION, SOLUTION INTRAVENOUS at 09:36

## 2023-11-25 RX ADMIN — CEFTRIAXONE SODIUM 2000 MG: 2 INJECTION, POWDER, FOR SOLUTION INTRAMUSCULAR; INTRAVENOUS at 15:36

## 2023-11-25 RX ADMIN — ASPIRIN 81 MG: 81 TABLET, COATED ORAL at 08:35

## 2023-11-25 RX ADMIN — SODIUM CHLORIDE: 9 INJECTION, SOLUTION INTRAVENOUS at 01:18

## 2023-11-25 RX ADMIN — TRAZODONE HYDROCHLORIDE 100 MG: 50 TABLET ORAL at 20:13

## 2023-11-25 RX ADMIN — HYDROMORPHONE HYDROCHLORIDE 0.5 MG: 1 INJECTION, SOLUTION INTRAMUSCULAR; INTRAVENOUS; SUBCUTANEOUS at 18:10

## 2023-11-25 ASSESSMENT — PAIN DESCRIPTION - LOCATION
LOCATION: BACK;BREAST;CHEST
LOCATION: CHEST;BACK
LOCATION: BACK

## 2023-11-25 ASSESSMENT — PAIN - FUNCTIONAL ASSESSMENT: PAIN_FUNCTIONAL_ASSESSMENT: ACTIVITIES ARE NOT PREVENTED

## 2023-11-25 ASSESSMENT — PAIN DESCRIPTION - PAIN TYPE: TYPE: ACUTE PAIN

## 2023-11-25 ASSESSMENT — PAIN SCALES - GENERAL
PAINLEVEL_OUTOF10: 0
PAINLEVEL_OUTOF10: 7
PAINLEVEL_OUTOF10: 3
PAINLEVEL_OUTOF10: 5

## 2023-11-25 ASSESSMENT — PAIN DESCRIPTION - DESCRIPTORS: DESCRIPTORS: ACHING

## 2023-11-25 ASSESSMENT — PAIN DESCRIPTION - ORIENTATION: ORIENTATION: LEFT

## 2023-11-25 NOTE — PLAN OF CARE
Problem: Discharge Planning  Goal: Discharge to home or other facility with appropriate resources  Outcome: Progressing  Flowsheets (Taken 11/25/2023 9473)  Discharge to home or other facility with appropriate resources: Identify barriers to discharge with patient and caregiver     Problem: Pain  Goal: Verbalizes/displays adequate comfort level or baseline comfort level  Outcome: Progressing     Problem: Safety - Adult  Goal: Free from fall injury  Outcome: Progressing

## 2023-11-25 NOTE — H&P
traZODone (DESYREL) 100 MG tablet TAKE 1 TABLET BY MOUTH AT  BEDTIME 2/22/18   Eduardo Lee MD   predniSONE (DELTASONE) 1 MG tablet Take 8 mg by mouth daily 7/12/18   Provider, MD Eduardo       Labs: Personally reviewed and interpreted for clinical significance. Recent Labs     11/24/23  1622   WBC 25.7*   HGB 13.4   HCT 40.6   PLT 88*     Recent Labs     11/24/23  1622      K 3.4*      CO2 21   BUN 28*   CREATININE 2.0*   CALCIUM 8.4   MG 1.70*     Recent Labs     11/24/23  1622 11/24/23  1853   TROPHS 66* 54*     No results for input(s): \"LABA1C\" in the last 72 hours. Recent Labs     11/24/23  1622   AST 22   ALT 28   BILITOT 0.7   ALKPHOS 39*     No results for input(s): \"INR\", \"LACTA\", \"TSH\" in the last 72 hours.      Shawn Bland DO

## 2023-11-25 NOTE — PLAN OF CARE
Problem: Discharge Planning  Goal: Discharge to home or other facility with appropriate resources  Outcome: Progressing  Flowsheets (Taken 11/25/2023 0045)  Discharge to home or other facility with appropriate resources: Identify barriers to discharge with patient and caregiver     Problem: Pain  Goal: Verbalizes/displays adequate comfort level or baseline comfort level  Outcome: Progressing     Problem: Safety - Adult  Goal: Free from fall injury  Outcome: Progressing

## 2023-11-26 ENCOUNTER — APPOINTMENT (OUTPATIENT)
Dept: CT IMAGING | Age: 68
DRG: 872 | End: 2023-11-26
Payer: MEDICARE

## 2023-11-26 LAB
ALBUMIN SERPL-MCNC: 2.9 G/DL (ref 3.4–5)
ALBUMIN/GLOB SERPL: 1.2 {RATIO} (ref 1.1–2.2)
ALP SERPL-CCNC: 45 U/L (ref 40–129)
ALT SERPL-CCNC: 25 U/L (ref 10–40)
ANION GAP SERPL CALCULATED.3IONS-SCNC: 11 MMOL/L (ref 3–16)
AST SERPL-CCNC: 20 U/L (ref 15–37)
BACTERIA UR CULT: ABNORMAL
BASOPHILS # BLD: 0 K/UL (ref 0–0.2)
BASOPHILS NFR BLD: 0.1 %
BILIRUB SERPL-MCNC: <0.2 MG/DL (ref 0–1)
BUN SERPL-MCNC: 31 MG/DL (ref 7–20)
CALCIUM SERPL-MCNC: 8.5 MG/DL (ref 8.3–10.6)
CHLORIDE SERPL-SCNC: 111 MMOL/L (ref 99–110)
CO2 SERPL-SCNC: 20 MMOL/L (ref 21–32)
CREAT SERPL-MCNC: 1.4 MG/DL (ref 0.6–1.2)
DEPRECATED RDW RBC AUTO: 14 % (ref 12.4–15.4)
EKG ATRIAL RATE: 70 BPM
EKG ATRIAL RATE: 80 BPM
EKG DIAGNOSIS: NORMAL
EKG DIAGNOSIS: NORMAL
EKG P AXIS: 32 DEGREES
EKG P-R INTERVAL: 130 MS
EKG P-R INTERVAL: 134 MS
EKG Q-T INTERVAL: 386 MS
EKG Q-T INTERVAL: 428 MS
EKG QRS DURATION: 72 MS
EKG QRS DURATION: 86 MS
EKG QTC CALCULATION (BAZETT): 445 MS
EKG QTC CALCULATION (BAZETT): 462 MS
EKG R AXIS: -1 DEGREES
EKG R AXIS: 148 DEGREES
EKG T AXIS: 130 DEGREES
EKG T AXIS: 62 DEGREES
EKG VENTRICULAR RATE: 70 BPM
EKG VENTRICULAR RATE: 80 BPM
EOSINOPHIL # BLD: 0.1 K/UL (ref 0–0.6)
EOSINOPHIL NFR BLD: 0.4 %
GFR SERPLBLD CREATININE-BSD FMLA CKD-EPI: 41 ML/MIN/{1.73_M2}
GLUCOSE SERPL-MCNC: 109 MG/DL (ref 70–99)
HCT VFR BLD AUTO: 36.4 % (ref 36–48)
HGB BLD-MCNC: 12.2 G/DL (ref 12–16)
LACTATE BLDV-SCNC: 2.8 MMOL/L (ref 0.4–2)
LYMPHOCYTES # BLD: 1.8 K/UL (ref 1–5.1)
LYMPHOCYTES NFR BLD: 8.1 %
MAGNESIUM SERPL-MCNC: 1.9 MG/DL (ref 1.8–2.4)
MCH RBC QN AUTO: 29.8 PG (ref 26–34)
MCHC RBC AUTO-ENTMCNC: 33.5 G/DL (ref 31–36)
MCV RBC AUTO: 89 FL (ref 80–100)
MONOCYTES # BLD: 0.9 K/UL (ref 0–1.3)
MONOCYTES NFR BLD: 3.9 %
NEUTROPHILS # BLD: 19.3 K/UL (ref 1.7–7.7)
NEUTROPHILS NFR BLD: 87.5 %
ORGANISM: ABNORMAL
PLATELET # BLD AUTO: 82 K/UL (ref 135–450)
PMV BLD AUTO: 9.9 FL (ref 5–10.5)
POTASSIUM SERPL-SCNC: 4.1 MMOL/L (ref 3.5–5.1)
PROT SERPL-MCNC: 5.4 G/DL (ref 6.4–8.2)
RBC # BLD AUTO: 4.1 M/UL (ref 4–5.2)
SODIUM SERPL-SCNC: 142 MMOL/L (ref 136–145)
TROPONIN, HIGH SENSITIVITY: 39 NG/L (ref 0–14)
WBC # BLD AUTO: 22 K/UL (ref 4–11)

## 2023-11-26 PROCEDURE — 6360000002 HC RX W HCPCS: Performed by: HOSPITALIST

## 2023-11-26 PROCEDURE — 74176 CT ABD & PELVIS W/O CONTRAST: CPT

## 2023-11-26 PROCEDURE — 85025 COMPLETE CBC W/AUTO DIFF WBC: CPT

## 2023-11-26 PROCEDURE — 6360000002 HC RX W HCPCS: Performed by: STUDENT IN AN ORGANIZED HEALTH CARE EDUCATION/TRAINING PROGRAM

## 2023-11-26 PROCEDURE — 80053 COMPREHEN METABOLIC PANEL: CPT

## 2023-11-26 PROCEDURE — 6370000000 HC RX 637 (ALT 250 FOR IP): Performed by: HOSPITALIST

## 2023-11-26 PROCEDURE — 93005 ELECTROCARDIOGRAM TRACING: CPT | Performed by: HOSPITALIST

## 2023-11-26 PROCEDURE — 93010 ELECTROCARDIOGRAM REPORT: CPT | Performed by: INTERNAL MEDICINE

## 2023-11-26 PROCEDURE — 1200000000 HC SEMI PRIVATE

## 2023-11-26 PROCEDURE — 83735 ASSAY OF MAGNESIUM: CPT

## 2023-11-26 PROCEDURE — 2580000003 HC RX 258: Performed by: HOSPITALIST

## 2023-11-26 PROCEDURE — 6370000000 HC RX 637 (ALT 250 FOR IP): Performed by: STUDENT IN AN ORGANIZED HEALTH CARE EDUCATION/TRAINING PROGRAM

## 2023-11-26 PROCEDURE — 83605 ASSAY OF LACTIC ACID: CPT

## 2023-11-26 PROCEDURE — 94640 AIRWAY INHALATION TREATMENT: CPT

## 2023-11-26 PROCEDURE — 2580000003 HC RX 258: Performed by: STUDENT IN AN ORGANIZED HEALTH CARE EDUCATION/TRAINING PROGRAM

## 2023-11-26 PROCEDURE — 36415 COLL VENOUS BLD VENIPUNCTURE: CPT

## 2023-11-26 PROCEDURE — 84484 ASSAY OF TROPONIN QUANT: CPT

## 2023-11-26 RX ORDER — METOPROLOL SUCCINATE 25 MG/1
25 TABLET, EXTENDED RELEASE ORAL DAILY
Status: DISCONTINUED | OUTPATIENT
Start: 2023-11-27 | End: 2023-11-28 | Stop reason: HOSPADM

## 2023-11-26 RX ORDER — HYDROXYZINE HYDROCHLORIDE 10 MG/1
10 TABLET, FILM COATED ORAL 3 TIMES DAILY PRN
Status: DISCONTINUED | OUTPATIENT
Start: 2023-11-26 | End: 2023-11-28 | Stop reason: HOSPADM

## 2023-11-26 RX ORDER — AMLODIPINE BESYLATE 5 MG/1
5 TABLET ORAL DAILY
Status: DISCONTINUED | OUTPATIENT
Start: 2023-11-26 | End: 2023-11-27

## 2023-11-26 RX ADMIN — CEFTRIAXONE SODIUM 2000 MG: 2 INJECTION, POWDER, FOR SOLUTION INTRAMUSCULAR; INTRAVENOUS at 15:11

## 2023-11-26 RX ADMIN — TRAZODONE HYDROCHLORIDE 100 MG: 50 TABLET ORAL at 20:12

## 2023-11-26 RX ADMIN — GUAIFENESIN 600 MG: 600 TABLET ORAL at 20:12

## 2023-11-26 RX ADMIN — HYDROMORPHONE HYDROCHLORIDE 0.5 MG: 1 INJECTION, SOLUTION INTRAMUSCULAR; INTRAVENOUS; SUBCUTANEOUS at 15:42

## 2023-11-26 RX ADMIN — SODIUM CHLORIDE: 9 INJECTION, SOLUTION INTRAVENOUS at 03:38

## 2023-11-26 RX ADMIN — ISOSORBIDE MONONITRATE 15 MG: 30 TABLET, EXTENDED RELEASE ORAL at 08:43

## 2023-11-26 RX ADMIN — HYDROMORPHONE HYDROCHLORIDE 0.5 MG: 1 INJECTION, SOLUTION INTRAMUSCULAR; INTRAVENOUS; SUBCUTANEOUS at 09:42

## 2023-11-26 RX ADMIN — ALBUTEROL SULFATE 2.5 MG: 2.5 SOLUTION RESPIRATORY (INHALATION) at 19:44

## 2023-11-26 RX ADMIN — SODIUM CHLORIDE: 9 INJECTION, SOLUTION INTRAVENOUS at 15:08

## 2023-11-26 RX ADMIN — AMLODIPINE BESYLATE 5 MG: 5 TABLET ORAL at 11:25

## 2023-11-26 RX ADMIN — ALBUTEROL SULFATE 2.5 MG: 2.5 SOLUTION RESPIRATORY (INHALATION) at 06:13

## 2023-11-26 RX ADMIN — ALBUTEROL SULFATE 2.5 MG: 2.5 SOLUTION RESPIRATORY (INHALATION) at 08:55

## 2023-11-26 RX ADMIN — HYDROMORPHONE HYDROCHLORIDE 0.5 MG: 1 INJECTION, SOLUTION INTRAMUSCULAR; INTRAVENOUS; SUBCUTANEOUS at 22:00

## 2023-11-26 RX ADMIN — SODIUM CHLORIDE 25 ML: 9 INJECTION, SOLUTION INTRAVENOUS at 15:10

## 2023-11-26 RX ADMIN — GUAIFENESIN 600 MG: 600 TABLET ORAL at 08:43

## 2023-11-26 RX ADMIN — Medication 10 ML: at 20:12

## 2023-11-26 RX ADMIN — HYDRALAZINE HYDROCHLORIDE 10 MG: 10 TABLET, FILM COATED ORAL at 03:36

## 2023-11-26 RX ADMIN — ASPIRIN 81 MG: 81 TABLET, COATED ORAL at 08:43

## 2023-11-26 RX ADMIN — METOPROLOL SUCCINATE 12.5 MG: 25 TABLET, EXTENDED RELEASE ORAL at 08:43

## 2023-11-26 ASSESSMENT — PAIN SCALES - GENERAL
PAINLEVEL_OUTOF10: 8
PAINLEVEL_OUTOF10: 8
PAINLEVEL_OUTOF10: 2
PAINLEVEL_OUTOF10: 2
PAINLEVEL_OUTOF10: 7

## 2023-11-26 ASSESSMENT — PAIN DESCRIPTION - DESCRIPTORS
DESCRIPTORS: CRAMPING
DESCRIPTORS: CRAMPING

## 2023-11-26 ASSESSMENT — PAIN DESCRIPTION - PAIN TYPE: TYPE: ACUTE PAIN

## 2023-11-26 ASSESSMENT — PAIN DESCRIPTION - LOCATION
LOCATION: ABDOMEN
LOCATION: ABDOMEN;BACK
LOCATION: ABDOMEN
LOCATION: ABDOMEN

## 2023-11-26 ASSESSMENT — PAIN DESCRIPTION - ORIENTATION: ORIENTATION: MID

## 2023-11-26 NOTE — PLAN OF CARE
Problem: Discharge Planning  Goal: Discharge to home or other facility with appropriate resources  11/25/2023 2308 by Jase Ramirez RN  Outcome: Progressing  Flowsheets (Taken 11/25/2023 2000)  Discharge to home or other facility with appropriate resources: Identify barriers to discharge with patient and caregiver  11/25/2023 1848 by Ariel Hazel RN  Outcome: Progressing  Flowsheets (Taken 11/25/2023 7191)  Discharge to home or other facility with appropriate resources: Identify barriers to discharge with patient and caregiver     Problem: Pain  Goal: Verbalizes/displays adequate comfort level or baseline comfort level  11/25/2023 2308 by Jase Ramirez RN  Outcome: Progressing  11/25/2023 1848 by Ariel Hazel RN  Outcome: Progressing     Problem: Safety - Adult  Goal: Free from fall injury  11/25/2023 2308 by Jase Ramirez RN  Outcome: Progressing  11/25/2023 1848 by Ariel Hazel RN  Outcome: Progressing

## 2023-11-26 NOTE — PLAN OF CARE
Problem: Discharge Planning  Goal: Discharge to home or other facility with appropriate resources  11/26/2023 1016 by Love Kelly RN  Outcome: Progressing  Flowsheets (Taken 11/26/2023 0746)  Discharge to home or other facility with appropriate resources: Identify barriers to discharge with patient and caregiver  11/25/2023 2308 by Olena Wheeler RN  Outcome: Progressing  Flowsheets (Taken 11/25/2023 2000)  Discharge to home or other facility with appropriate resources: Identify barriers to discharge with patient and caregiver     Problem: Pain  Goal: Verbalizes/displays adequate comfort level or baseline comfort level  11/26/2023 1016 by Love Kelly RN  Outcome: Progressing  11/25/2023 2308 by Olena Wheeler, RN  Outcome: Progressing     Problem: Safety - Adult  Goal: Free from fall injury  11/26/2023 1016 by Love Kelly RN  Outcome: Progressing  11/25/2023 2308 by Olena Wheeler RN  Outcome: Progressing

## 2023-11-27 LAB
ALBUMIN SERPL-MCNC: 3.4 G/DL (ref 3.4–5)
ALBUMIN/GLOB SERPL: 1.1 {RATIO} (ref 1.1–2.2)
ALP SERPL-CCNC: 43 U/L (ref 40–129)
ALT SERPL-CCNC: 30 U/L (ref 10–40)
ANION GAP SERPL CALCULATED.3IONS-SCNC: 11 MMOL/L (ref 3–16)
AST SERPL-CCNC: 19 U/L (ref 15–37)
BACTERIA BLD CULT ORG #2: ABNORMAL
BACTERIA BLD CULT: ABNORMAL
BACTERIA BLD CULT: ABNORMAL
BASOPHILS # BLD: 0 K/UL (ref 0–0.2)
BASOPHILS NFR BLD: 0.2 %
BILIRUB SERPL-MCNC: 0.4 MG/DL (ref 0–1)
BUN SERPL-MCNC: 21 MG/DL (ref 7–20)
CALCIUM SERPL-MCNC: 9.2 MG/DL (ref 8.3–10.6)
CHLORIDE SERPL-SCNC: 105 MMOL/L (ref 99–110)
CO2 SERPL-SCNC: 25 MMOL/L (ref 21–32)
CREAT SERPL-MCNC: 1.4 MG/DL (ref 0.6–1.2)
DEPRECATED RDW RBC AUTO: 14 % (ref 12.4–15.4)
EOSINOPHIL # BLD: 0.2 K/UL (ref 0–0.6)
EOSINOPHIL NFR BLD: 1.6 %
GFR SERPLBLD CREATININE-BSD FMLA CKD-EPI: 41 ML/MIN/{1.73_M2}
GLUCOSE SERPL-MCNC: 98 MG/DL (ref 70–99)
HCT VFR BLD AUTO: 40.6 % (ref 36–48)
HGB BLD-MCNC: 13.6 G/DL (ref 12–16)
LYMPHOCYTES # BLD: 1.4 K/UL (ref 1–5.1)
LYMPHOCYTES NFR BLD: 11.6 %
MCH RBC QN AUTO: 30.2 PG (ref 26–34)
MCHC RBC AUTO-ENTMCNC: 33.5 G/DL (ref 31–36)
MCV RBC AUTO: 90.1 FL (ref 80–100)
MONOCYTES # BLD: 0.5 K/UL (ref 0–1.3)
MONOCYTES NFR BLD: 4.6 %
NEUTROPHILS # BLD: 9.6 K/UL (ref 1.7–7.7)
NEUTROPHILS NFR BLD: 82 %
ORGANISM: ABNORMAL
PLATELET # BLD AUTO: 93 K/UL (ref 135–450)
PMV BLD AUTO: 9.4 FL (ref 5–10.5)
POTASSIUM SERPL-SCNC: 3.7 MMOL/L (ref 3.5–5.1)
PROT SERPL-MCNC: 6.6 G/DL (ref 6.4–8.2)
RBC # BLD AUTO: 4.5 M/UL (ref 4–5.2)
SODIUM SERPL-SCNC: 141 MMOL/L (ref 136–145)
WBC # BLD AUTO: 11.7 K/UL (ref 4–11)

## 2023-11-27 PROCEDURE — 2580000003 HC RX 258: Performed by: HOSPITALIST

## 2023-11-27 PROCEDURE — 6360000002 HC RX W HCPCS: Performed by: STUDENT IN AN ORGANIZED HEALTH CARE EDUCATION/TRAINING PROGRAM

## 2023-11-27 PROCEDURE — 94640 AIRWAY INHALATION TREATMENT: CPT

## 2023-11-27 PROCEDURE — 6360000002 HC RX W HCPCS: Performed by: HOSPITALIST

## 2023-11-27 PROCEDURE — 1200000000 HC SEMI PRIVATE

## 2023-11-27 PROCEDURE — 6370000000 HC RX 637 (ALT 250 FOR IP): Performed by: HOSPITALIST

## 2023-11-27 PROCEDURE — 6370000000 HC RX 637 (ALT 250 FOR IP): Performed by: INTERNAL MEDICINE

## 2023-11-27 PROCEDURE — 85025 COMPLETE CBC W/AUTO DIFF WBC: CPT

## 2023-11-27 PROCEDURE — 6370000000 HC RX 637 (ALT 250 FOR IP): Performed by: STUDENT IN AN ORGANIZED HEALTH CARE EDUCATION/TRAINING PROGRAM

## 2023-11-27 PROCEDURE — 80053 COMPREHEN METABOLIC PANEL: CPT

## 2023-11-27 PROCEDURE — 2580000003 HC RX 258: Performed by: STUDENT IN AN ORGANIZED HEALTH CARE EDUCATION/TRAINING PROGRAM

## 2023-11-27 PROCEDURE — 6360000002 HC RX W HCPCS: Performed by: NURSE PRACTITIONER

## 2023-11-27 PROCEDURE — 6360000002 HC RX W HCPCS: Performed by: INTERNAL MEDICINE

## 2023-11-27 PROCEDURE — 36415 COLL VENOUS BLD VENIPUNCTURE: CPT

## 2023-11-27 RX ORDER — METHYLPREDNISOLONE SODIUM SUCCINATE 40 MG/ML
40 INJECTION, POWDER, LYOPHILIZED, FOR SOLUTION INTRAMUSCULAR; INTRAVENOUS DAILY
Status: DISCONTINUED | OUTPATIENT
Start: 2023-11-27 | End: 2023-11-28 | Stop reason: HOSPADM

## 2023-11-27 RX ORDER — PROMETHAZINE HYDROCHLORIDE AND CODEINE PHOSPHATE 6.25; 1 MG/5ML; MG/5ML
5 SYRUP ORAL EVERY 4 HOURS PRN
Status: DISCONTINUED | OUTPATIENT
Start: 2023-11-27 | End: 2023-11-28 | Stop reason: HOSPADM

## 2023-11-27 RX ORDER — FUROSEMIDE 10 MG/ML
20 INJECTION INTRAMUSCULAR; INTRAVENOUS ONCE
Status: COMPLETED | OUTPATIENT
Start: 2023-11-27 | End: 2023-11-27

## 2023-11-27 RX ORDER — PROCHLORPERAZINE EDISYLATE 5 MG/ML
10 INJECTION INTRAMUSCULAR; INTRAVENOUS EVERY 6 HOURS PRN
Status: DISCONTINUED | OUTPATIENT
Start: 2023-11-27 | End: 2023-11-28 | Stop reason: HOSPADM

## 2023-11-27 RX ORDER — OXYCODONE HYDROCHLORIDE 5 MG/1
5 TABLET ORAL EVERY 4 HOURS PRN
Status: DISCONTINUED | OUTPATIENT
Start: 2023-11-27 | End: 2023-11-28 | Stop reason: HOSPADM

## 2023-11-27 RX ORDER — IPRATROPIUM BROMIDE AND ALBUTEROL SULFATE 2.5; .5 MG/3ML; MG/3ML
1 SOLUTION RESPIRATORY (INHALATION)
Status: DISCONTINUED | OUTPATIENT
Start: 2023-11-27 | End: 2023-11-28 | Stop reason: HOSPADM

## 2023-11-27 RX ADMIN — HYDRALAZINE HYDROCHLORIDE 10 MG: 10 TABLET, FILM COATED ORAL at 01:04

## 2023-11-27 RX ADMIN — ALBUTEROL SULFATE 2.5 MG: 2.5 SOLUTION RESPIRATORY (INHALATION) at 11:19

## 2023-11-27 RX ADMIN — METHYLPREDNISOLONE SODIUM SUCCINATE 40 MG: 40 INJECTION, POWDER, LYOPHILIZED, FOR SOLUTION INTRAMUSCULAR; INTRAVENOUS at 18:28

## 2023-11-27 RX ADMIN — OXYCODONE 5 MG: 5 TABLET ORAL at 23:17

## 2023-11-27 RX ADMIN — FUROSEMIDE 20 MG: 20 INJECTION, SOLUTION INTRAMUSCULAR; INTRAVENOUS at 04:31

## 2023-11-27 RX ADMIN — GUAIFENESIN 600 MG: 600 TABLET ORAL at 08:07

## 2023-11-27 RX ADMIN — OXYCODONE 5 MG: 5 TABLET ORAL at 18:21

## 2023-11-27 RX ADMIN — ASPIRIN 81 MG: 81 TABLET, COATED ORAL at 08:07

## 2023-11-27 RX ADMIN — TRAZODONE HYDROCHLORIDE 100 MG: 50 TABLET ORAL at 19:53

## 2023-11-27 RX ADMIN — ISOSORBIDE MONONITRATE 15 MG: 30 TABLET, EXTENDED RELEASE ORAL at 08:05

## 2023-11-27 RX ADMIN — METOPROLOL SUCCINATE 25 MG: 25 TABLET, EXTENDED RELEASE ORAL at 08:05

## 2023-11-27 RX ADMIN — HYDRALAZINE HYDROCHLORIDE 10 MG: 10 TABLET, FILM COATED ORAL at 23:17

## 2023-11-27 RX ADMIN — Medication 5 ML: at 18:21

## 2023-11-27 RX ADMIN — Medication 10 ML: at 19:54

## 2023-11-27 RX ADMIN — HYDROMORPHONE HYDROCHLORIDE 0.5 MG: 1 INJECTION, SOLUTION INTRAMUSCULAR; INTRAVENOUS; SUBCUTANEOUS at 12:18

## 2023-11-27 RX ADMIN — IPRATROPIUM BROMIDE AND ALBUTEROL SULFATE 1 DOSE: 2.5; .5 SOLUTION RESPIRATORY (INHALATION) at 20:26

## 2023-11-27 RX ADMIN — Medication 10 ML: at 08:07

## 2023-11-27 RX ADMIN — ACETAMINOPHEN 1000 MG: 500 TABLET ORAL at 01:03

## 2023-11-27 RX ADMIN — GUAIFENESIN 600 MG: 600 TABLET ORAL at 19:53

## 2023-11-27 RX ADMIN — HYDROMORPHONE HYDROCHLORIDE 0.5 MG: 1 INJECTION, SOLUTION INTRAMUSCULAR; INTRAVENOUS; SUBCUTANEOUS at 04:01

## 2023-11-27 RX ADMIN — AMLODIPINE BESYLATE 5 MG: 5 TABLET ORAL at 08:05

## 2023-11-27 RX ADMIN — CEFTRIAXONE SODIUM 2000 MG: 2 INJECTION, POWDER, FOR SOLUTION INTRAMUSCULAR; INTRAVENOUS at 15:52

## 2023-11-27 ASSESSMENT — PAIN DESCRIPTION - LOCATION
LOCATION: FLANK
LOCATION: FLANK
LOCATION: ABDOMEN;BACK

## 2023-11-27 ASSESSMENT — PAIN SCALES - GENERAL
PAINLEVEL_OUTOF10: 0
PAINLEVEL_OUTOF10: 8
PAINLEVEL_OUTOF10: 2
PAINLEVEL_OUTOF10: 7
PAINLEVEL_OUTOF10: 0
PAINLEVEL_OUTOF10: 2
PAINLEVEL_OUTOF10: 6
PAINLEVEL_OUTOF10: 6

## 2023-11-27 ASSESSMENT — PAIN DESCRIPTION - DESCRIPTORS: DESCRIPTORS: CRAMPING

## 2023-11-27 ASSESSMENT — PAIN DESCRIPTION - ORIENTATION
ORIENTATION: LEFT
ORIENTATION: MID;LEFT

## 2023-11-27 NOTE — PLAN OF CARE
Problem: Discharge Planning  Goal: Discharge to home or other facility with appropriate resources  Outcome: Progressing  Flowsheets (Taken 11/27/2023 0112)  Discharge to home or other facility with appropriate resources:   Identify barriers to discharge with patient and caregiver   Arrange for needed discharge resources and transportation as appropriate     Problem: Pain  Goal: Verbalizes/displays adequate comfort level or baseline comfort level  Outcome: Progressing  Flowsheets (Taken 11/27/2023 0112)  Verbalizes/displays adequate comfort level or baseline comfort level:   Encourage patient to monitor pain and request assistance   Assess pain using appropriate pain scale     Problem: Safety - Adult  Goal: Free from fall injury  Outcome: Progressing  Flowsheets (Taken 11/27/2023 0112)  Free From Fall Injury: Instruct family/caregiver on patient safety

## 2023-11-27 NOTE — FLOWSHEET NOTE
FILEMON Romeo notified of vital signs, and of change in patients respiratory condition. Waiting for response at the time. 11/27/23 0400   Vital Signs   BP (!) 177/92   MAP (Calculated) 120         Orders placed see MAR. Patient reports feeling better since medication was given.

## 2023-11-27 NOTE — ACP (ADVANCE CARE PLANNING)
Advance Care Planning     General Advance Care Planning (ACP) Conversation    Date of Conversation: 11/24/2023  Conducted with: Patient with [address]: Next of Kin by law (only applies in absence of above) (name) Rima Kumar spouse [phone]    Healthcare Decision Maker:    Secondary Decision Maker: Penelope Shipley - Child - [phone]  Click here to complete Healthcare Decision Makers including selection of the Healthcare Decision Maker Relationship (ie \"Primary\"). Today we documented Decision Maker(s) consistent with Legal Next of Kin hierarchy.     Content/Action Overview:  Has NO ACP documents/care preferences - information provided, considering goals and options  Reviewed DNR/DNI and patient elects Full Code (Attempt Resuscitation)      Length of Voluntary ACP Conversation in minutes:  <16 minutes (Non-Billable)    Saira Leon RN

## 2023-11-27 NOTE — PLAN OF CARE
Problem: Discharge Planning  Goal: Discharge to home or other facility with appropriate resources  11/27/2023 0958 by Edmund Yee RN  Outcome: Progressing  Flowsheets (Taken 11/27/2023 0160)  Discharge to home or other facility with appropriate resources:   Identify barriers to discharge with patient and caregiver   Arrange for needed discharge resources and transportation as appropriate  11/27/2023 0112 by Edmund Yee RN  Outcome: Progressing  Flowsheets (Taken 11/27/2023 0112)  Discharge to home or other facility with appropriate resources:   Identify barriers to discharge with patient and caregiver   Arrange for needed discharge resources and transportation as appropriate     Problem: Pain  Goal: Verbalizes/displays adequate comfort level or baseline comfort level  11/27/2023 0958 by Edmund Yee RN  Outcome: Progressing  Flowsheets (Taken 11/27/2023 9696)  Verbalizes/displays adequate comfort level or baseline comfort level:   Encourage patient to monitor pain and request assistance   Assess pain using appropriate pain scale   Administer analgesics based on type and severity of pain and evaluate response  11/27/2023 0112 by Edmund Yee RN  Outcome: Progressing  Flowsheets (Taken 11/27/2023 0112)  Verbalizes/displays adequate comfort level or baseline comfort level:   Encourage patient to monitor pain and request assistance   Assess pain using appropriate pain scale     Problem: Safety - Adult  Goal: Free from fall injury  11/27/2023 0958 by Edmund Yee RN  Outcome: Progressing  Flowsheets (Taken 11/27/2023 0958)  Free From Fall Injury: Instruct family/caregiver on patient safety  11/27/2023 0112 by Edmund Yee RN  Outcome: Progressing  Flowsheets (Taken 11/27/2023 0112)  Free From Fall Injury: Instruct family/caregiver on patient safety

## 2023-11-28 VITALS
DIASTOLIC BLOOD PRESSURE: 68 MMHG | HEIGHT: 67 IN | OXYGEN SATURATION: 93 % | SYSTOLIC BLOOD PRESSURE: 116 MMHG | WEIGHT: 163 LBS | BODY MASS INDEX: 25.58 KG/M2 | TEMPERATURE: 97.6 F | HEART RATE: 67 BPM | RESPIRATION RATE: 17 BRPM

## 2023-11-28 LAB
ANION GAP SERPL CALCULATED.3IONS-SCNC: 13 MMOL/L (ref 3–16)
BASOPHILS # BLD: 0 K/UL (ref 0–0.2)
BASOPHILS NFR BLD: 0 %
BUN SERPL-MCNC: 26 MG/DL (ref 7–20)
BURR CELLS BLD QL SMEAR: ABNORMAL
CALCIUM SERPL-MCNC: 9.2 MG/DL (ref 8.3–10.6)
CHLORIDE SERPL-SCNC: 103 MMOL/L (ref 99–110)
CO2 SERPL-SCNC: 25 MMOL/L (ref 21–32)
CREAT SERPL-MCNC: 1.4 MG/DL (ref 0.6–1.2)
DEPRECATED RDW RBC AUTO: 13.3 % (ref 12.4–15.4)
EOSINOPHIL # BLD: 0 K/UL (ref 0–0.6)
EOSINOPHIL NFR BLD: 0 %
GFR SERPLBLD CREATININE-BSD FMLA CKD-EPI: 41 ML/MIN/{1.73_M2}
GLUCOSE SERPL-MCNC: 158 MG/DL (ref 70–99)
HCT VFR BLD AUTO: 39.5 % (ref 36–48)
HGB BLD-MCNC: 13.6 G/DL (ref 12–16)
LACTATE BLDV-SCNC: 1.2 MMOL/L (ref 0.4–2)
LYMPHOCYTES # BLD: 0.7 K/UL (ref 1–5.1)
LYMPHOCYTES NFR BLD: 10 %
MCH RBC QN AUTO: 30.6 PG (ref 26–34)
MCHC RBC AUTO-ENTMCNC: 34.4 G/DL (ref 31–36)
MCV RBC AUTO: 89 FL (ref 80–100)
MONOCYTES # BLD: 0.4 K/UL (ref 0–1.3)
MONOCYTES NFR BLD: 5 %
NEUTROPHILS # BLD: 6 K/UL (ref 1.7–7.7)
NEUTROPHILS NFR BLD: 80 %
NEUTS BAND NFR BLD MANUAL: 5 % (ref 0–7)
NT-PROBNP SERPL-MCNC: 4165 PG/ML (ref 0–124)
OVALOCYTES BLD QL SMEAR: ABNORMAL
PLATELET # BLD AUTO: 95 K/UL (ref 135–450)
PLATELET BLD QL SMEAR: ABNORMAL
PMV BLD AUTO: 9 FL (ref 5–10.5)
POIKILOCYTOSIS BLD QL SMEAR: ABNORMAL
POTASSIUM SERPL-SCNC: 4.4 MMOL/L (ref 3.5–5.1)
RBC # BLD AUTO: 4.44 M/UL (ref 4–5.2)
SLIDE REVIEW: ABNORMAL
SODIUM SERPL-SCNC: 141 MMOL/L (ref 136–145)
WBC # BLD AUTO: 7.1 K/UL (ref 4–11)

## 2023-11-28 PROCEDURE — 2580000003 HC RX 258: Performed by: STUDENT IN AN ORGANIZED HEALTH CARE EDUCATION/TRAINING PROGRAM

## 2023-11-28 PROCEDURE — 80048 BASIC METABOLIC PNL TOTAL CA: CPT

## 2023-11-28 PROCEDURE — 83605 ASSAY OF LACTIC ACID: CPT

## 2023-11-28 PROCEDURE — 93306 TTE W/DOPPLER COMPLETE: CPT

## 2023-11-28 PROCEDURE — 2580000003 HC RX 258

## 2023-11-28 PROCEDURE — 6370000000 HC RX 637 (ALT 250 FOR IP): Performed by: INTERNAL MEDICINE

## 2023-11-28 PROCEDURE — 85025 COMPLETE CBC W/AUTO DIFF WBC: CPT

## 2023-11-28 PROCEDURE — 6370000000 HC RX 637 (ALT 250 FOR IP): Performed by: STUDENT IN AN ORGANIZED HEALTH CARE EDUCATION/TRAINING PROGRAM

## 2023-11-28 PROCEDURE — 93356 MYOCRD STRAIN IMG SPCKL TRCK: CPT

## 2023-11-28 PROCEDURE — 2580000003 HC RX 258: Performed by: HOSPITALIST

## 2023-11-28 PROCEDURE — 83880 ASSAY OF NATRIURETIC PEPTIDE: CPT

## 2023-11-28 PROCEDURE — 6360000002 HC RX W HCPCS: Performed by: INTERNAL MEDICINE

## 2023-11-28 PROCEDURE — 6370000000 HC RX 637 (ALT 250 FOR IP): Performed by: HOSPITALIST

## 2023-11-28 PROCEDURE — 94640 AIRWAY INHALATION TREATMENT: CPT

## 2023-11-28 PROCEDURE — 99222 1ST HOSP IP/OBS MODERATE 55: CPT | Performed by: SURGERY

## 2023-11-28 PROCEDURE — 6360000002 HC RX W HCPCS: Performed by: HOSPITALIST

## 2023-11-28 RX ORDER — GUAIFENESIN 600 MG/1
600 TABLET, EXTENDED RELEASE ORAL 2 TIMES DAILY
Qty: 20 TABLET | Refills: 0 | Status: SHIPPED | OUTPATIENT
Start: 2023-11-28

## 2023-11-28 RX ORDER — PROMETHAZINE HYDROCHLORIDE AND CODEINE PHOSPHATE 6.25; 1 MG/5ML; MG/5ML
5 SYRUP ORAL EVERY 4 HOURS PRN
Qty: 90 ML | Refills: 0 | Status: SHIPPED | OUTPATIENT
Start: 2023-11-28 | End: 2023-12-01

## 2023-11-28 RX ORDER — BUDESONIDE AND FORMOTEROL FUMARATE DIHYDRATE 160; 4.5 UG/1; UG/1
2 AEROSOL RESPIRATORY (INHALATION) 2 TIMES DAILY
Qty: 2 EACH | Refills: 1 | Status: SHIPPED | OUTPATIENT
Start: 2023-11-28 | End: 2024-01-27

## 2023-11-28 RX ORDER — CIPROFLOXACIN 500 MG/1
500 TABLET, FILM COATED ORAL 2 TIMES DAILY
Qty: 14 TABLET | Refills: 0 | Status: SHIPPED | OUTPATIENT
Start: 2023-11-28 | End: 2023-12-05

## 2023-11-28 RX ORDER — OXYCODONE HYDROCHLORIDE 5 MG/1
5 TABLET ORAL EVERY 4 HOURS PRN
Qty: 10 TABLET | Refills: 0 | Status: SHIPPED | OUTPATIENT
Start: 2023-11-28 | End: 2023-12-01

## 2023-11-28 RX ORDER — PREDNISONE 20 MG/1
40 TABLET ORAL DAILY
Qty: 10 TABLET | Refills: 0 | Status: SHIPPED | OUTPATIENT
Start: 2023-11-28 | End: 2023-12-03

## 2023-11-28 RX ORDER — WATER 10 ML/10ML
INJECTION INTRAMUSCULAR; INTRAVENOUS; SUBCUTANEOUS
Status: COMPLETED
Start: 2023-11-28 | End: 2023-11-28

## 2023-11-28 RX ORDER — CIPROFLOXACIN 500 MG/1
500 TABLET, FILM COATED ORAL 2 TIMES DAILY
Status: DISCONTINUED | OUTPATIENT
Start: 2023-11-28 | End: 2023-11-28 | Stop reason: HOSPADM

## 2023-11-28 RX ADMIN — METOPROLOL SUCCINATE 25 MG: 25 TABLET, EXTENDED RELEASE ORAL at 08:29

## 2023-11-28 RX ADMIN — Medication 5 ML: at 08:30

## 2023-11-28 RX ADMIN — Medication 5 ML: at 14:37

## 2023-11-28 RX ADMIN — OXYCODONE 5 MG: 5 TABLET ORAL at 04:36

## 2023-11-28 RX ADMIN — IPRATROPIUM BROMIDE AND ALBUTEROL SULFATE 1 DOSE: 2.5; .5 SOLUTION RESPIRATORY (INHALATION) at 15:50

## 2023-11-28 RX ADMIN — Medication 5 ML: at 04:36

## 2023-11-28 RX ADMIN — OXYCODONE 5 MG: 5 TABLET ORAL at 14:37

## 2023-11-28 RX ADMIN — GUAIFENESIN 600 MG: 600 TABLET ORAL at 08:29

## 2023-11-28 RX ADMIN — IPRATROPIUM BROMIDE AND ALBUTEROL SULFATE 1 DOSE: 2.5; .5 SOLUTION RESPIRATORY (INHALATION) at 11:33

## 2023-11-28 RX ADMIN — OXYCODONE 5 MG: 5 TABLET ORAL at 08:29

## 2023-11-28 RX ADMIN — METHYLPREDNISOLONE SODIUM SUCCINATE 40 MG: 40 INJECTION, POWDER, LYOPHILIZED, FOR SOLUTION INTRAMUSCULAR; INTRAVENOUS at 08:30

## 2023-11-28 RX ADMIN — CIPROFLOXACIN HYDROCHLORIDE 500 MG: 500 TABLET, FILM COATED ORAL at 15:52

## 2023-11-28 RX ADMIN — WATER 10 ML: 1 INJECTION INTRAMUSCULAR; INTRAVENOUS; SUBCUTANEOUS at 08:30

## 2023-11-28 RX ADMIN — Medication 10 ML: at 08:31

## 2023-11-28 RX ADMIN — ISOSORBIDE MONONITRATE 15 MG: 30 TABLET, EXTENDED RELEASE ORAL at 08:30

## 2023-11-28 RX ADMIN — IPRATROPIUM BROMIDE AND ALBUTEROL SULFATE 1 DOSE: 2.5; .5 SOLUTION RESPIRATORY (INHALATION) at 07:30

## 2023-11-28 RX ADMIN — ASPIRIN 81 MG: 81 TABLET, COATED ORAL at 08:29

## 2023-11-28 ASSESSMENT — PAIN DESCRIPTION - LOCATION
LOCATION: ABDOMEN
LOCATION: FLANK
LOCATION: FINGER (COMMENT WHICH ONE)
LOCATION: FLANK
LOCATION: FLANK

## 2023-11-28 ASSESSMENT — PAIN DESCRIPTION - ORIENTATION
ORIENTATION: LEFT

## 2023-11-28 ASSESSMENT — PAIN SCALES - GENERAL
PAINLEVEL_OUTOF10: 6
PAINLEVEL_OUTOF10: 6
PAINLEVEL_OUTOF10: 1
PAINLEVEL_OUTOF10: 6
PAINLEVEL_OUTOF10: 7

## 2023-11-28 NOTE — PLAN OF CARE
Problem: Discharge Planning  Goal: Discharge to home or other facility with appropriate resources  Outcome: Progressing  Flowsheets (Taken 11/27/2023 1951)  Discharge to home or other facility with appropriate resources:   Identify barriers to discharge with patient and caregiver   Arrange for needed discharge resources and transportation as appropriate   Identify discharge learning needs (meds, wound care, etc)     Problem: Pain  Goal: Verbalizes/displays adequate comfort level or baseline comfort level  Outcome: Progressing     Problem: Safety - Adult  Goal: Free from fall injury  Outcome: Progressing

## 2023-11-28 NOTE — CARE COORDINATION
CASE MANAGEMENT DISCHARGE SUMMARY      Discharge to: Home    IMM given: 11/28/2023    Transportation:    Family/car: Private    Confirmed discharge plan with:     Patient: yes     Family:  yes,  at the bedside     RN, name: Ronen Key RN
discharge:      Financial    Payor: [address] / Plan: MEDICARE PART A AND B / Product Type: *No Product type* /     Does insurance require precert for SNF: No    Potential assistance Purchasing Medications:    Meds-to-Beds request: Yes      [facility] [place] [address], [address] [phone] - F [phone]  [address] [address] [place]  Phone: [phone] Fax: [phone]      Notes:    Factors facilitating achievement of predicted outcomes: Family support, Motivated, Cooperative, Pleasant, and Good insight into deficits    Barriers to discharge: none    Additional Case Management Notes: Pt lives in a 2 story home with her . She was sleeping and  at the bedside. He states she is IPTA and denies needs. Will continue to follow course for needs. Tamiko Dumas RN     The Plan for Transition of Care is related to the following treatment goals of Hypomagnesemia [E83.42]  Septicemia ([address]) [A41.9]  Acute cystitis with hematuria [N30.01]  Sepsis ([address]) [T35.4]    IF APPLICABLE: The Patient and/or patient representative Mouna Aguilar and her family were provided with a choice of provider and agrees with the discharge plan. Freedom of choice list with basic dialogue that supports the patient's individualized plan of care/goals and shares the quality data associated with the providers was provided to:     Patient Representative Name:       The Patient and/or Patient Representative Agree with the Discharge Plan?       Tamiko Dumas RN  Case Management Department

## 2023-11-28 NOTE — CONSULTS
Department of General Surgery Consult    PATIENT NAME: Romana Jack OF BIRTH: 1955    ADMISSION DATE: 11/24/2023  4:15 PM      TODAY'S DATE: 11/28/2023    Reason for Consult:  Incisional hernia, abdominal pain    Chief Complaint: LLQ/flank pain    Historian: patient    Requesting Physician:  Sadie    HISTORY OF PRESENT ILLNESS:              The patient is a 70 y.o. female who presents with admission for acute pyelonephritis, with associated CRISTOPHER and also acute asthma/COPD exacerbation. Pt states she has had multiple incisional hernias related to remote h/o multiple colon surgeries (colectomy/ileostomy, then ostomy reversal) over 20 years ago. She saw a surgeon about these hernias back then and he recommended monitoring for any change in appearance/nature/symptoms related to the hernia. She states that she is aware of the hernias when she is physically active or straining (or coughing). But she denies any acute or chronic changes to the hernias - no larger bulging or increased symptoms .     Past Medical History:        Diagnosis Date    CAD (coronary artery disease)     Hyperlipidemia     Hypertension     NSTEMI (non-ST elevated myocardial infarction) ([address])     Polymyalgia rheumatica (HCC)     Rupture of bowel (HCC)     had a temporary colostomy       Past Surgical History:        Procedure Laterality Date    CORONARY ANGIOPLASTY WITH STENT PLACEMENT      HYSTERECTOMY (CERVIX STATUS UNKNOWN)         Current Medications:   Current Facility-Administered Medications: ipratropium 0.5 mg-albuterol 2.5 mg (DUONEB) nebulizer solution 1 Dose, 1 Dose, Inhalation, Q4H WA RT  prochlorperazine (COMPAZINE) injection 10 mg, 10 mg, IntraVENous, Q6H PRN  promethazine-codeine (PHENERGAN with CODEINE) 6.25-10 MG/5ML syrup 5 mL, 5 mL, Oral, Q4H PRN  oxyCODONE (ROXICODONE) immediate release tablet 5 mg, 5 mg, Oral, Q4H PRN  methylPREDNISolone sodium succ (SOLU-MEDROL) injection 40 mg, 40 mg, IntraVENous,

## 2023-11-30 NOTE — DISCHARGE SUMMARY
Discharge Summary     Name:  Radha Fenton /Age/Sex: 1955 (56 y.o. female)   Admit Date: 2023  Discharge Date: 23   MRN & CSN:  [id number] & [id number] Encounter Date and Time 23 11:17 AM EST    Attending:  Mikayla Ramos MD Discharging Provider: Mikayla Raoms MD         Hospital Course: The patient is a pleasant 70 Y F with a h/o former smoking, PMR many years ago (long since off steroids), collagenous colitis many years ago (also long since off medications for this), HTN, HLD, and NSTEMI s/p PCI in  with preserved EF and no RWMA's on TTE at that time. She developed acute flank pain and chills and came to the ED. She was found to be septic with a left-shifted leukocytosis of 32, fever of 101.1, tachycardia, and a lactic acid of 3.3. Urine and both blood cultures ended up showing e.coli, and CT suggested bilateral pyelonephritis. Bilateral pyelonephritis, complicated by e.coli bacteremia and sepsis. Ceftriaxone here, will discharge with PO cipro for another 7 days. - noted the possible 1mm R ureteral stone on CT. Patient denies persistent R flank pain, did not appear to have obstructive uropathy, no further imaging necessary at this point. If symptoms do not continue to improve over the next few days to weeks then reconsider this issue. CRISTOPHER, due to above. Improved with IVFs. Baseline Cr perhaps about 1.2 per [facility] records. About a month of severe coughing. Patient was wheezing badly. She smoked for 40 years but has never been tested for COPD. She should have PFTs as outpatient. Clinically this seems like a COPD or asthma exacerbation. Started steroids and nebulizers here, she improved quickly, will discharge with a few days of PO prednisone. She started PRN albuterol a few weeks ago with PCP. Will offer additional LABA+ICS upon discharge. Also cough suppressants.    - patient has concerns about CHF, though no pulmonary edema or

## 2023-12-06 ENCOUNTER — TELEPHONE (OUTPATIENT)
Dept: CASE MANAGEMENT | Age: 68
End: 2023-12-06

## 2023-12-06 NOTE — TELEPHONE ENCOUNTER
Imaging report CT Abd 11/26/23 with f/u imaging recommendations sent to Nelson SOLIS    [address] [facility] [place])  [email or web address]

## 2025-01-28 ENCOUNTER — ANESTHESIA EVENT (OUTPATIENT)
Dept: OPERATING ROOM | Age: 70
End: 2025-01-28
Payer: MEDICARE

## 2025-01-30 RX ORDER — SPIRONOLACTONE 25 MG/1
25 TABLET ORAL DAILY
COMMUNITY

## 2025-01-30 RX ORDER — BACILLUS COAGULANS/INULIN 1B-250 MG
1 CAPSULE ORAL DAILY
COMMUNITY

## 2025-01-30 RX ORDER — MULTIVITAMIN
1 TABLET ORAL DAILY
COMMUNITY

## 2025-01-30 RX ORDER — METHOTREXATE 2.5 MG/1
12.5 TABLET ORAL WEEKLY
COMMUNITY
Start: 2025-01-05

## 2025-01-30 NOTE — PROGRESS NOTES

## 2025-02-06 NOTE — ANESTHESIA PRE PROCEDURE
Department of Anesthesiology  Preprocedure Note       Name:  Sofia Hamilton   Age:  69 y.o.  :  1955                                          MRN:  [id number]         Date:  2025      Surgeon: Surgeon(s):  Kodi Mcfadden MD    Procedure: Procedure(s):  PHACO EMULSIFICATION OF CATARACT WITH INTRAOCULAR LENS IMPLANT RIGHT EYE    Medications prior to admission:   Prior to Admission medications    Medication Sig Start Date End Date Taking? Authorizing Provider   omeprazole (PRILOSEC) 20 MG delayed release capsule Take 1 capsule by mouth daily   Yes Eduardo Lee MD   spironolactone (ALDACTONE) 25 MG tablet Take 1 tablet by mouth daily   Yes Eduardo Lee MD   methotrexate (RHEUMATREX) 2.5 MG chemo tablet Take 5 tablets by mouth once a week 25  Yes Eduardo Lee MD   revefenacin 175 MCG/3ML SOLN Inhale 75 mcg into the lungs daily 3/25/24  Yes Eduardo Lee MD   psyllium (KONSYL) 28.3 % PACK Take 1 packet by mouth daily   Yes ProviderEduardo MD   Bacillus Coagulans-Inulin (PROBIOTIC) 1-250 BILLION-MG CAPS Take 1 capsule by mouth daily   Yes Provider, MD Eduardo   Multiple Vitamin (MULTI-VITAMIN) TABS Take 1 tablet by mouth daily    Eduardo Lee MD   guaiFENesin (MUCINEX) 600 MG extended release tablet Take 1 tablet by mouth 2 times daily 23   Costa Noel MD   budesonide-formoterol (SYMBICORT) 160-4.5 MCG/ACT AERO Inhale 2 puffs into the lungs 2 times daily 23  Costa Noel MD   rosuvastatin (CRESTOR) 40 MG tablet Take 1 tablet by mouth every evening    Eduardo Lee MD   ezetimibe (ZETIA) 10 MG tablet Take 1 tablet by mouth daily    Eduardo Lee MD   aspirin 81 MG tablet Take 1 tablet by mouth daily 18   Eduardo Lee MD   isosorbide mononitrate (IMDUR) 30 MG extended release tablet Take 1 tablet by mouth 18   Eduardo Lee MD   metoprolol succinate (TOPROL XL) 25 MG extended

## 2025-02-07 ENCOUNTER — HOSPITAL ENCOUNTER (OUTPATIENT)
Age: 70
Setting detail: OUTPATIENT SURGERY
Discharge: HOME | End: 2025-02-07
Attending: OPHTHALMOLOGY | Admitting: OPHTHALMOLOGY
Payer: MEDICARE

## 2025-02-07 ENCOUNTER — ANESTHESIA (OUTPATIENT)
Dept: OPERATING ROOM | Age: 70
End: 2025-02-07
Payer: MEDICARE

## 2025-02-07 VITALS
HEIGHT: 66 IN | TEMPERATURE: 97.5 F | WEIGHT: 166 LBS | HEART RATE: 65 BPM | BODY MASS INDEX: 26.68 KG/M2 | SYSTOLIC BLOOD PRESSURE: 142 MMHG | RESPIRATION RATE: 17 BRPM | OXYGEN SATURATION: 99 % | DIASTOLIC BLOOD PRESSURE: 75 MMHG

## 2025-02-07 PROCEDURE — V2632 POST CHMBR INTRAOCULAR LENS: HCPCS | Performed by: OPHTHALMOLOGY

## 2025-02-07 PROCEDURE — 7100000011 HC PHASE II RECOVERY - ADDTL 15 MIN: Performed by: OPHTHALMOLOGY

## 2025-02-07 PROCEDURE — 2709999900 HC NON-CHARGEABLE SUPPLY: Performed by: OPHTHALMOLOGY

## 2025-02-07 PROCEDURE — 3600000013 HC SURGERY LEVEL 3 ADDTL 15MIN: Performed by: OPHTHALMOLOGY

## 2025-02-07 PROCEDURE — 6370000000 HC RX 637 (ALT 250 FOR IP): Performed by: OPHTHALMOLOGY

## 2025-02-07 PROCEDURE — 7100000010 HC PHASE II RECOVERY - FIRST 15 MIN: Performed by: OPHTHALMOLOGY

## 2025-02-07 PROCEDURE — 3700000001 HC ADD 15 MINUTES (ANESTHESIA): Performed by: OPHTHALMOLOGY

## 2025-02-07 PROCEDURE — 2500000003 HC RX 250 WO HCPCS: Performed by: OPHTHALMOLOGY

## 2025-02-07 PROCEDURE — 3700000000 HC ANESTHESIA ATTENDED CARE: Performed by: OPHTHALMOLOGY

## 2025-02-07 PROCEDURE — 6360000002 HC RX W HCPCS: Performed by: OPHTHALMOLOGY

## 2025-02-07 PROCEDURE — 2580000003 HC RX 258: Performed by: ANESTHESIOLOGY

## 2025-02-07 PROCEDURE — 3600000003 HC SURGERY LEVEL 3 BASE: Performed by: OPHTHALMOLOGY

## 2025-02-07 PROCEDURE — 6360000002 HC RX W HCPCS: Performed by: NURSE ANESTHETIST, CERTIFIED REGISTERED

## 2025-02-07 PROCEDURE — 7100000000 HC PACU RECOVERY - FIRST 15 MIN: Performed by: OPHTHALMOLOGY

## 2025-02-07 PROCEDURE — 7100000001 HC PACU RECOVERY - ADDTL 15 MIN: Performed by: OPHTHALMOLOGY

## 2025-02-07 PROCEDURE — 2500000003 HC RX 250 WO HCPCS: Performed by: ANESTHESIOLOGY

## 2025-02-07 DEVICE — STERILE UV AND BLUE LIGHT FILTERING ACRYLIC FOLDABLE SINGLE-PIECE POSTERIOR CHAMBER LENSES WITH THE ULTRASERT® PRE-LOADED DELIVERY SYSTEM
Type: IMPLANTABLE DEVICE | Site: EYE | Status: FUNCTIONAL
Brand: ACRYSOF® ULTRASERT®

## 2025-02-07 RX ORDER — PREDNISOLONE ACETATE 10 MG/ML
SUSPENSION/ DROPS OPHTHALMIC PRN
Status: DISCONTINUED | OUTPATIENT
Start: 2025-02-07 | End: 2025-02-07 | Stop reason: ALTCHOICE

## 2025-02-07 RX ORDER — CYCLOPENT/TROPIC/PHEN/KETR/WAT 1 %-1%-10%
0.3 DROPS (EA) OPHTHALMIC (EYE) EVERY 10 MIN PRN
Status: COMPLETED | OUTPATIENT
Start: 2025-02-07 | End: 2025-02-07

## 2025-02-07 RX ORDER — PREDNISOLONE ACETATE 10 MG/ML
1 SUSPENSION/ DROPS OPHTHALMIC CONTINUOUS
Status: DISCONTINUED | OUTPATIENT
Start: 2025-02-07 | End: 2025-02-07 | Stop reason: HOSPADM

## 2025-02-07 RX ORDER — TOBRAMYCIN AND DEXAMETHASONE 3; 1 MG/ML; MG/ML
SUSPENSION/ DROPS OPHTHALMIC PRN
Status: DISCONTINUED | OUTPATIENT
Start: 2025-02-07 | End: 2025-02-07 | Stop reason: ALTCHOICE

## 2025-02-07 RX ORDER — TOBRAMYCIN AND DEXAMETHASONE 3; 1 MG/ML; MG/ML
1 SUSPENSION/ DROPS OPHTHALMIC CONTINUOUS
Status: DISCONTINUED | OUTPATIENT
Start: 2025-02-07 | End: 2025-02-07 | Stop reason: HOSPADM

## 2025-02-07 RX ORDER — LIDOCAINE HYDROCHLORIDE 20 MG/ML
INJECTION, SOLUTION INFILTRATION; PERINEURAL
Status: DISCONTINUED | OUTPATIENT
Start: 2025-02-07 | End: 2025-02-07 | Stop reason: SDUPTHER

## 2025-02-07 RX ORDER — SODIUM CHLORIDE, SODIUM LACTATE, POTASSIUM CHLORIDE, CALCIUM CHLORIDE 600; 310; 30; 20 MG/100ML; MG/100ML; MG/100ML; MG/100ML
INJECTION, SOLUTION INTRAVENOUS CONTINUOUS
Status: DISCONTINUED | OUTPATIENT
Start: 2025-02-07 | End: 2025-02-07 | Stop reason: HOSPADM

## 2025-02-07 RX ORDER — TETRACAINE HYDROCHLORIDE 5 MG/ML
1 SOLUTION OPHTHALMIC EVERY 10 MIN PRN
Status: COMPLETED | OUTPATIENT
Start: 2025-02-07 | End: 2025-02-07

## 2025-02-07 RX ORDER — PROPOFOL 10 MG/ML
INJECTION, EMULSION INTRAVENOUS
Status: DISCONTINUED | OUTPATIENT
Start: 2025-02-07 | End: 2025-02-07 | Stop reason: SDUPTHER

## 2025-02-07 RX ORDER — SODIUM CHLORIDE 9 MG/ML
INJECTION, SOLUTION INTRAVENOUS PRN
Status: DISCONTINUED | OUTPATIENT
Start: 2025-02-07 | End: 2025-02-07 | Stop reason: HOSPADM

## 2025-02-07 RX ORDER — SODIUM CHLORIDE 0.9 % (FLUSH) 0.9 %
5-40 SYRINGE (ML) INJECTION PRN
Status: DISCONTINUED | OUTPATIENT
Start: 2025-02-07 | End: 2025-02-07 | Stop reason: HOSPADM

## 2025-02-07 RX ORDER — SODIUM CHLORIDE 0.9 % (FLUSH) 0.9 %
5-40 SYRINGE (ML) INJECTION EVERY 12 HOURS SCHEDULED
Status: DISCONTINUED | OUTPATIENT
Start: 2025-02-07 | End: 2025-02-07 | Stop reason: HOSPADM

## 2025-02-07 RX ORDER — DEXAMETHASONE SODIUM PHOSPHATE 4 MG/ML
INJECTION, SOLUTION INTRA-ARTICULAR; INTRALESIONAL; INTRAMUSCULAR; INTRAVENOUS; SOFT TISSUE
Status: DISCONTINUED | OUTPATIENT
Start: 2025-02-07 | End: 2025-02-07 | Stop reason: SDUPTHER

## 2025-02-07 RX ORDER — ONDANSETRON 2 MG/ML
INJECTION INTRAMUSCULAR; INTRAVENOUS
Status: DISCONTINUED | OUTPATIENT
Start: 2025-02-07 | End: 2025-02-07 | Stop reason: SDUPTHER

## 2025-02-07 RX ORDER — BROMFENAC 1.03 MG/ML
1 SOLUTION/ DROPS OPHTHALMIC ONCE
Status: COMPLETED | OUTPATIENT
Start: 2025-02-07 | End: 2025-02-07

## 2025-02-07 RX ORDER — PILOCARPINE HYDROCHLORIDE 20 MG/ML
SOLUTION/ DROPS OPHTHALMIC PRN
Status: DISCONTINUED | OUTPATIENT
Start: 2025-02-07 | End: 2025-02-07 | Stop reason: ALTCHOICE

## 2025-02-07 RX ORDER — TETRACAINE HYDROCHLORIDE 5 MG/ML
SOLUTION OPHTHALMIC PRN
Status: DISCONTINUED | OUTPATIENT
Start: 2025-02-07 | End: 2025-02-07 | Stop reason: ALTCHOICE

## 2025-02-07 RX ORDER — BROMFENAC 1.03 MG/ML
1 SOLUTION/ DROPS OPHTHALMIC CONTINUOUS
Status: DISCONTINUED | OUTPATIENT
Start: 2025-02-07 | End: 2025-02-07 | Stop reason: HOSPADM

## 2025-02-07 RX ORDER — SODIUM CHLORIDE, POTASSIUM CHLORIDE, CALCIUM CHLORIDE, MAGNESIUM CHLORIDE, SODIUM ACETATE, AND SODIUM CITRATE 6.4; .75; .48; .3; 3.9; 1.7 MG/ML; MG/ML; MG/ML; MG/ML; MG/ML; MG/ML
SOLUTION IRRIGATION PRN
Status: DISCONTINUED | OUTPATIENT
Start: 2025-02-07 | End: 2025-02-07 | Stop reason: ALTCHOICE

## 2025-02-07 RX ADMIN — TETRACAINE HYDROCHLORIDE 1 DROP: 5 SOLUTION OPHTHALMIC at 06:54

## 2025-02-07 RX ADMIN — BROMFENAC 1 DROP: 0.9 SOLUTION/ DROPS OPHTHALMIC at 06:54

## 2025-02-07 RX ADMIN — Medication 0.3 ML: at 07:20

## 2025-02-07 RX ADMIN — SODIUM CHLORIDE, POTASSIUM CHLORIDE, SODIUM LACTATE AND CALCIUM CHLORIDE: 600; 310; 30; 20 INJECTION, SOLUTION INTRAVENOUS at 08:18

## 2025-02-07 RX ADMIN — LIDOCAINE HYDROCHLORIDE 60 MG: 20 INJECTION, SOLUTION INFILTRATION; PERINEURAL at 08:23

## 2025-02-07 RX ADMIN — Medication 0.3 ML: at 07:06

## 2025-02-07 RX ADMIN — ONDANSETRON 4 MG: 2 INJECTION, SOLUTION INTRAMUSCULAR; INTRAVENOUS at 08:23

## 2025-02-07 RX ADMIN — Medication 0.3 ML: at 06:54

## 2025-02-07 RX ADMIN — TETRACAINE HYDROCHLORIDE 1 DROP: 5 SOLUTION OPHTHALMIC at 07:06

## 2025-02-07 RX ADMIN — Medication 20 MG: at 07:03

## 2025-02-07 RX ADMIN — TETRACAINE HYDROCHLORIDE 1 DROP: 5 SOLUTION OPHTHALMIC at 07:20

## 2025-02-07 RX ADMIN — PROPOFOL 180 MG: 10 INJECTION, EMULSION INTRAVENOUS at 08:23

## 2025-02-07 RX ADMIN — DEXAMETHASONE SODIUM PHOSPHATE 4 MG: 4 INJECTION INTRA-ARTICULAR; INTRALESIONAL; INTRAMUSCULAR; INTRAVENOUS; SOFT TISSUE at 08:23

## 2025-02-07 ASSESSMENT — PAIN SCALES - GENERAL
PAINLEVEL_OUTOF10: 0

## 2025-02-07 ASSESSMENT — PAIN - FUNCTIONAL ASSESSMENT
PAIN_FUNCTIONAL_ASSESSMENT: 0-10
PAIN_FUNCTIONAL_ASSESSMENT: 0-10

## 2025-02-07 NOTE — ANESTHESIA POSTPROCEDURE EVALUATION
Department of Anesthesiology  Postprocedure Note    Patient: Sofia Hamilton  MRN: [id number]  YOB: 1955  Date of evaluation: 2/7/2025    Procedure Summary       Date: 02/07/25 Room / Location: [address]    Anesthesia Start: 0818 Anesthesia Stop: 0855    Procedure: PHACO EMULSIFICATION OF CATARACT WITH INTRAOCULAR LENS IMPLANT RIGHT EYE (Right: Eye) Diagnosis:       Age-related nuclear cataract of right eye      (Age-related nuclear cataract of right eye [H25.11])    Surgeons: Kodi Mcfadden MD Responsible Provider: Duke Anguiano MD    Anesthesia Type: general ASA Status: 3            Anesthesia Type: No value filed.    Flora Phase I: Flora Score: 10    Flora Phase II: Flora Score: 10    Anesthesia Post Evaluation    Comments: Postoperative Anesthesia Note    Name:    Sofia Hamilton  MRN:      [id number]    Patient Vitals in the past 12 hrs:  02/07/25 0915, BP:(!) 142/75, Pulse:65, Resp:17, SpO2:99 %  02/07/25 0906, BP:(!) 144/79, Pulse:56, Resp:13, SpO2:98 %  02/07/25 0900, BP:(!) 147/83, Pulse:65, Resp:12, SpO2:98 %  02/07/25 0855, BP:(!) 157/72, Temp:97.5 °F (36.4 °C), Temp src:Oral, Pulse:64, Resp:15, SpO2:98 %  02/07/25 0652, BP:(!) 144/77, Temp:98 °F (36.7 °C), Temp src:Oral, Pulse:60, Resp:16, SpO2:97 %  02/07/25 0648, Height:1.676 m (5' 6\"), Weight:75.3 kg (166 lb)     LABS:    CBC  Lab Results       Component                Value               Date/Time                  WBC                      7.1                 11/28/2023 06:43 AM        HGB                      13.6                11/28/2023 06:43 AM        HCT                      39.5                11/28/2023 06:43 AM        PLT                      95 (L)              11/28/2023 06:43 AM   RENAL  Lab Results       Component                Value               Date/Time                  NA                       141                 11/28/2023 06:44 AM        K                        4.4

## 2025-02-07 NOTE — DISCHARGE INSTRUCTIONS
General Instructions After Surgery  (For One Week)    Kodi Mcfadden M.D.  Office [phone]  Office  [phone]    1. Tobradex eye drops: one drop every two hours while awake start at _11:00 am_________       Continue one drop every two hours until seen in office at ______02/07/2025 at 1:05 pm_____    2. Bring all eye medications to the office with you today/tomorrow.    3. It is important to remember not to bend your head below your waist (you can squat or bend down on one knee, keeping your head upright.)    4. DO NOT LIFT objects heavier than 10 pounds, which is equal to a gallon of milk.    5. You can wash your face, but avoid the area around your operated eye. We recommend a partial bath today and tomorrow. When you resume normal bathing, be sure to turn your back in the shower avoiding water and shampoo from running into your operated eye. Avoid washing your hair for the first three days.    6. Lie on the un operated side while sleeping, or on your back.    7. PROTECT YOUR OPERATED EYE Wear your shield at bedtime at all times. During the day be sure to wear your glasses (or sunglasses provided) if you leave home. Remember your glasses will make your vision blurry in your operated eye, but will not in anyway damage your eye.    8. NEVER RUB OR PUSH ON YOUR EYE This is the worst thing that you can do while it is healing.    9. It is normal for the white part of the eye to appear red or bloodshot. DO NOT WORRY, this will clear in several weeks.    10. You should have NO PAIN after surgery. The eye may feel slightly irritated at first and (2) Tylenol may be used. If you have actual pain, increasing discomfort or sudden decrease in vision call us immediately.    11. Temporary \"floaters\" are not uncommon immediately after surgery, these will disappear. Your vision should gradually improve. Notify us immediately if you notice a shower of new floaters or flashes develop.

## 2025-02-07 NOTE — H&P
Surgical Service History and Physical    CHIEF COMPLAINT:   Decreased Vision and Glare    Reason for Admission:  Cataract Surgery    History Obtained From:  Patient    HISTORY OF PRESENT ILLNESS:  Pt has noticed painless progressive loss of vision and is experiencing functional difficulty.          Past Medical History:        Diagnosis Date    CAD (coronary artery disease)     CKD (chronic kidney disease)     Hyperlipidemia     Hypertension     NSTEMI (non-ST elevated myocardial infarction) (HCC)     Polymyalgia rheumatica (HCC)     Rupture of bowel (HCC)     had a temporary colostomy       Past Surgical History:        Procedure Laterality Date    ABDOMEN SURGERY      CORONARY ANGIOPLASTY WITH STENT PLACEMENT      HYSTERECTOMY (CERVIX STATUS UNKNOWN)         Allergies:  Patient has no known allergies.    Prior to Admission medications    Medication Sig Start Date End Date Taking? Authorizing Provider   omeprazole (PRILOSEC) 20 MG delayed release capsule Take 1 capsule by mouth daily   Yes Eduardo Lee MD   spironolactone (ALDACTONE) 25 MG tablet Take 1 tablet by mouth daily   Yes Eduardo Lee MD   Multiple Vitamin (MULTI-VITAMIN) TABS Take 1 tablet by mouth daily   Yes Eduardo Lee MD   methotrexate (RHEUMATREX) 2.5 MG chemo tablet Take 5 tablets by mouth once a week 1/5/25  Yes Eduardo Lee MD   revefenacin 175 MCG/3ML SOLN Inhale 75 mcg into the lungs daily 3/25/24  Yes Eduardo Lee MD   psyllium (KONSYL) 28.3 % PACK Take 1 packet by mouth daily   Yes Eduardo Lee MD   Bacillus Coagulans-Inulin (PROBIOTIC) 1-250 BILLION-MG CAPS Take 1 capsule by mouth daily   Yes Eduardo Lee MD   rosuvastatin (CRESTOR) 40 MG tablet Take 1 tablet by mouth every evening   Yes Eduardo Lee MD   ezetimibe (ZETIA) 10 MG tablet Take 1 tablet by mouth daily   Yes Eduardo Lee MD   isosorbide mononitrate (IMDUR) 30 MG extended release tablet Take 1 tablet

## 2025-02-07 NOTE — OP NOTE
OPERATIVE NOTE    Patient Name   Date of Birth Age  MRNEdson Hamilton   1955   69 y.o.  [id number]       Surgeon        Surgery Date  Kodi Mcfadden MD        2/7/2025       Preoperative Diagnosis:  Nuclear Sclerotic Cataract right eye    Postoperative Diagnosis: Nuclear Sclerotic Cataract right eye    Operative Procedure: Phacoemulsification/ Posterior Chamber Intraocular Lens Implantation          Anesthesia:   General/LMA with Topical Tetracaine    Details of Procedure:    The patient was brought to the operating room on the operative stretcher in the supine position with the head resting in the head rest.  After appropriate anesthesia monitoring devices were applied, IV sedation was carried out per the anesthesia service.  The LMA device was introduced and general anesthesia was induced by the anesthesia service.    The operative eye was prepped and draped in the usual sterile fashion. Tobradex drops and Tetracaine drops were administered.  A wire lid speculum was then inserted into the operative eye.  A paracentesis was then performed using a 15 degree supersharp blade to enter the globe at the limbus, and a .12 mm colibri forceps was used to stabilize the globe.  Viscoat was then instilled into the anterior chamber.  A temporal clear cornea groove was then created using the 15 degree supersharp blade.  The cornea was then entered using the Moreno 2.4 mm clearcut keratome blade.  The capsulotomy was then performed using a cystotome, and the capsulorrhexis was completed using uttrata forceps.  The nucleus of the cataract was then hydrodissected and hydrodelineated using sterile BSS on a 27 gauge cannula.  The nucleus of the cataract was then removed using the phacoemilsification/aspiration device.  This was performed in a bimanual/CHOP technique.  The remaining cortex was then removed using the irrigation/aspiration device.  The posterior capsule was polished using the I/A device with CAP/VAC settings.   The capsular bag was reformed using [employer].    The previously selected Moreno Acrysof +19.5 diopter [id number] intraocular lens implant was then inserted using the cartridge system.  It was spun in place using a Kuglen hook.  It was centered and found to be in good, stable position.  The remaining viscoelastic was then removed using the I/A device.  The corneal incision was then hydrated using sterile BSS on a 30 gauge cannula.  It was checked and found to be water-tight.    Pilocarpine 2%, followed by Tobradex ophthalmic solutions were then instilled into the operative eye.  The wire lid speculum was removed, and a postoperative protective shield was applied.  The LMA device was then removed and the patient was easily aroused.  The patient was then transferred to the postanesthesia recovery unit in good stable condition.  The patient tolerated the procedure well without complications.  A postoperative instruction sheet was given, and the patient will follow up with Dr. Mcfadden's office as scheduled      EBL:  None    Specimen:  noneOperative Note      Patient: Sofia Hamilton  YOB: 1955  MRN: [id number]    Date of Procedure: 2/7/2025    Pre-Op Diagnosis Codes:      * Age-related nuclear cataract of right eye [H25.11]    Post-Op Diagnosis: Same       Procedure(s):  PHACO EMULSIFICATION OF CATARACT WITH INTRAOCULAR LENS IMPLANT RIGHT EYE    Surgeon(s):  Kodi Mcfadden MD    Assistant:   * No surgical staff found *    Anesthesia: General    Estimated Blood Loss (mL): none    Complications: None    Specimens:   * No specimens in log *    Implants:  * No implants in log *      Drains: * No LDAs found *    Findings:  Infection Present At Time Of Surgery (PATOS) (choose all levels that have infection present):  No infection present  Other Findings:     Electronically signed by Kodi Mcfadden MD on 2/7/2025 at 8:16 AM

## 2025-02-25 NOTE — PROGRESS NOTES
PRE OP INSTRUCTION SHEET   1. Do not eat or drink anything after 12 midnight  prior to surgery. This includes no water, chewing gum or mints.   2. Take the following pills will a small sip of water (see MAR)                                        3. Aspirin, Ibuprofen, Advil, Naproxen, Vitamin E, fish oil and other Anti-inflammatory products should be stopped for 5 days before surgery or as directed by your physician.   4. Check with your Doctor regarding stopping Plavix, Coumadin, Lovenox, Fragmin or other blood thinners   5. Do not smoke, and do not drink any alcoholic beverages 24 hours prior to surgery.  This includes NA Beer.   6. You may brush your teeth and gargle the morning of surgery.  DO NOT SWALLOW WATER   7. You MUST make arrangements for a responsible adult to take you home after your surgery. You will not be allowed to leave alone or drive yourself home.  It is strongly suggested someone stay with you the first 24 hrs. Your surgery will be cancelled if you do not have a ride home.   8. A parent/legal guardian must accompany a child scheduled for surgery and plan to stay at the hospital until the child is discharged.  Please do not bring other children with you.   9. Please wear simple, loose fitting clothing to the hospital.  Do not bring valuables (money, credit cards, checkbooks, etc.) Do not wear any makeup (including no eye makeup) or nail polish on your fingers or toes.   10. DO NOT wear any jewelry or piercings on day of surgery.  All body piercing jewelry must be removed.   11. If you have dentures,glasses, or contacts they will be removed before going to the OR; we will provide you a container.    12. Please see your family doctor/and cardiologist for a history & physical and/or concerning medications.  Bring any test results/reports from your physician's office. Have history and labs faxed to [phone]    13. Remember to bring Blood Bank bracelet on the day of  surgery.   14. If you have a Living Will and Durable Power of  for Healthcare, please bring in a copy.   15. Notify your Surgeon if you develop any illness between now and surgery  time, cough, cold, fever, sore throat, nausea, vomiting, etc.  Please notify your surgeon if you experience dizziness, shortness of breath or blurred vision between now & the time of your surgery   16. DO NOT shave your operative site 96 hours prior to surgery. For face & neck surgery, men may use an electric razor 48 hours prior to surgery.   17. Shower with _x__Antibacterial soap (x_chlorhexidine for total joint  Pt's) shower two times before surgery.(the morning of and the night before.   18. To provide excellent care visitors will be limited to one in the room at any given time.  Please call pre admission testing if you any further questions [phone] or [phone]

## 2025-02-27 ENCOUNTER — ANESTHESIA EVENT (OUTPATIENT)
Dept: OPERATING ROOM | Age: 70
End: 2025-02-27
Payer: MEDICARE

## 2025-02-28 ENCOUNTER — HOSPITAL ENCOUNTER (OUTPATIENT)
Age: 70
Setting detail: OUTPATIENT SURGERY
Discharge: HOME | End: 2025-02-28
Attending: OPHTHALMOLOGY | Admitting: OPHTHALMOLOGY
Payer: MEDICARE

## 2025-02-28 ENCOUNTER — ANESTHESIA (OUTPATIENT)
Dept: OPERATING ROOM | Age: 70
End: 2025-02-28
Payer: MEDICARE

## 2025-02-28 VITALS
BODY MASS INDEX: 25.55 KG/M2 | RESPIRATION RATE: 13 BRPM | SYSTOLIC BLOOD PRESSURE: 137 MMHG | HEIGHT: 66 IN | DIASTOLIC BLOOD PRESSURE: 79 MMHG | WEIGHT: 159 LBS | OXYGEN SATURATION: 100 % | HEART RATE: 68 BPM | TEMPERATURE: 97.7 F

## 2025-02-28 PROCEDURE — 2500000003 HC RX 250 WO HCPCS: Performed by: OPHTHALMOLOGY

## 2025-02-28 PROCEDURE — 6370000000 HC RX 637 (ALT 250 FOR IP): Performed by: OPHTHALMOLOGY

## 2025-02-28 PROCEDURE — 7100000001 HC PACU RECOVERY - ADDTL 15 MIN: Performed by: OPHTHALMOLOGY

## 2025-02-28 PROCEDURE — 2709999900 HC NON-CHARGEABLE SUPPLY: Performed by: OPHTHALMOLOGY

## 2025-02-28 PROCEDURE — 2580000003 HC RX 258: Performed by: ANESTHESIOLOGY

## 2025-02-28 PROCEDURE — 3600000003 HC SURGERY LEVEL 3 BASE: Performed by: OPHTHALMOLOGY

## 2025-02-28 PROCEDURE — 7100000000 HC PACU RECOVERY - FIRST 15 MIN: Performed by: OPHTHALMOLOGY

## 2025-02-28 PROCEDURE — 7100000011 HC PHASE II RECOVERY - ADDTL 15 MIN: Performed by: OPHTHALMOLOGY

## 2025-02-28 PROCEDURE — 3700000000 HC ANESTHESIA ATTENDED CARE: Performed by: OPHTHALMOLOGY

## 2025-02-28 PROCEDURE — 3700000001 HC ADD 15 MINUTES (ANESTHESIA): Performed by: OPHTHALMOLOGY

## 2025-02-28 PROCEDURE — 7100000010 HC PHASE II RECOVERY - FIRST 15 MIN: Performed by: OPHTHALMOLOGY

## 2025-02-28 PROCEDURE — 6360000002 HC RX W HCPCS: Performed by: OPHTHALMOLOGY

## 2025-02-28 PROCEDURE — 3600000013 HC SURGERY LEVEL 3 ADDTL 15MIN: Performed by: OPHTHALMOLOGY

## 2025-02-28 PROCEDURE — V2632 POST CHMBR INTRAOCULAR LENS: HCPCS | Performed by: OPHTHALMOLOGY

## 2025-02-28 PROCEDURE — 6360000002 HC RX W HCPCS

## 2025-02-28 DEVICE — STERILE UV AND BLUE LIGHT FILTERING ACRYLIC FOLDABLE SINGLE-PIECE POSTERIOR CHAMBER LENSES WITH THE ULTRASERT® PRE-LOADED DELIVERY SYSTEM
Type: IMPLANTABLE DEVICE | Site: EYE | Status: FUNCTIONAL
Brand: ACRYSOF® ULTRASERT®

## 2025-02-28 RX ORDER — BROMFENAC 1.03 MG/ML
1 SOLUTION/ DROPS OPHTHALMIC CONTINUOUS
Status: DISCONTINUED | OUTPATIENT
Start: 2025-02-28 | End: 2025-02-28 | Stop reason: HOSPADM

## 2025-02-28 RX ORDER — OXYCODONE HYDROCHLORIDE 5 MG/1
5 TABLET ORAL PRN
Status: DISCONTINUED | OUTPATIENT
Start: 2025-02-28 | End: 2025-02-28 | Stop reason: HOSPADM

## 2025-02-28 RX ORDER — LIDOCAINE HYDROCHLORIDE 20 MG/ML
INJECTION, SOLUTION INFILTRATION; PERINEURAL
Status: DISCONTINUED | OUTPATIENT
Start: 2025-02-28 | End: 2025-02-28 | Stop reason: SDUPTHER

## 2025-02-28 RX ORDER — SODIUM CHLORIDE 9 MG/ML
INJECTION, SOLUTION INTRAVENOUS PRN
Status: DISCONTINUED | OUTPATIENT
Start: 2025-02-28 | End: 2025-02-28 | Stop reason: HOSPADM

## 2025-02-28 RX ORDER — ONDANSETRON 2 MG/ML
4 INJECTION INTRAMUSCULAR; INTRAVENOUS
Status: DISCONTINUED | OUTPATIENT
Start: 2025-02-28 | End: 2025-02-28 | Stop reason: HOSPADM

## 2025-02-28 RX ORDER — DIPHENHYDRAMINE HYDROCHLORIDE 50 MG/ML
12.5 INJECTION INTRAMUSCULAR; INTRAVENOUS
Status: DISCONTINUED | OUTPATIENT
Start: 2025-02-28 | End: 2025-02-28 | Stop reason: HOSPADM

## 2025-02-28 RX ORDER — TETRACAINE HYDROCHLORIDE 5 MG/ML
1 SOLUTION OPHTHALMIC PRN
Status: COMPLETED | OUTPATIENT
Start: 2025-02-28 | End: 2025-02-28

## 2025-02-28 RX ORDER — PROPOFOL 10 MG/ML
INJECTION, EMULSION INTRAVENOUS
Status: DISCONTINUED | OUTPATIENT
Start: 2025-02-28 | End: 2025-02-28 | Stop reason: SDUPTHER

## 2025-02-28 RX ORDER — PILOCARPINE HYDROCHLORIDE 20 MG/ML
SOLUTION/ DROPS OPHTHALMIC PRN
Status: DISCONTINUED | OUTPATIENT
Start: 2025-02-28 | End: 2025-02-28 | Stop reason: ALTCHOICE

## 2025-02-28 RX ORDER — PREDNISOLONE ACETATE 10 MG/ML
SUSPENSION/ DROPS OPHTHALMIC PRN
Status: DISCONTINUED | OUTPATIENT
Start: 2025-02-28 | End: 2025-02-28 | Stop reason: ALTCHOICE

## 2025-02-28 RX ORDER — SODIUM CHLORIDE, SODIUM LACTATE, POTASSIUM CHLORIDE, CALCIUM CHLORIDE 600; 310; 30; 20 MG/100ML; MG/100ML; MG/100ML; MG/100ML
INJECTION, SOLUTION INTRAVENOUS CONTINUOUS
Status: DISCONTINUED | OUTPATIENT
Start: 2025-02-28 | End: 2025-02-28 | Stop reason: HOSPADM

## 2025-02-28 RX ORDER — SODIUM CHLORIDE 0.9 % (FLUSH) 0.9 %
5-40 SYRINGE (ML) INJECTION EVERY 12 HOURS SCHEDULED
Status: DISCONTINUED | OUTPATIENT
Start: 2025-02-28 | End: 2025-02-28 | Stop reason: HOSPADM

## 2025-02-28 RX ORDER — NALOXONE HYDROCHLORIDE 0.4 MG/ML
INJECTION, SOLUTION INTRAMUSCULAR; INTRAVENOUS; SUBCUTANEOUS PRN
Status: DISCONTINUED | OUTPATIENT
Start: 2025-02-28 | End: 2025-02-28 | Stop reason: HOSPADM

## 2025-02-28 RX ORDER — TOBRAMYCIN AND DEXAMETHASONE 3; 1 MG/ML; MG/ML
1 SUSPENSION/ DROPS OPHTHALMIC CONTINUOUS
Status: DISCONTINUED | OUTPATIENT
Start: 2025-02-28 | End: 2025-02-28 | Stop reason: HOSPADM

## 2025-02-28 RX ORDER — PREDNISOLONE ACETATE 10 MG/ML
1 SUSPENSION/ DROPS OPHTHALMIC CONTINUOUS
Status: DISCONTINUED | OUTPATIENT
Start: 2025-02-28 | End: 2025-02-28 | Stop reason: HOSPADM

## 2025-02-28 RX ORDER — LABETALOL HYDROCHLORIDE 5 MG/ML
10 INJECTION, SOLUTION INTRAVENOUS
Status: DISCONTINUED | OUTPATIENT
Start: 2025-02-28 | End: 2025-02-28 | Stop reason: HOSPADM

## 2025-02-28 RX ORDER — BROMFENAC 1.03 MG/ML
1 SOLUTION/ DROPS OPHTHALMIC ONCE
Status: COMPLETED | OUTPATIENT
Start: 2025-02-28 | End: 2025-02-28

## 2025-02-28 RX ORDER — OXYCODONE HYDROCHLORIDE 5 MG/1
10 TABLET ORAL PRN
Status: DISCONTINUED | OUTPATIENT
Start: 2025-02-28 | End: 2025-02-28 | Stop reason: HOSPADM

## 2025-02-28 RX ORDER — SODIUM CHLORIDE 0.9 % (FLUSH) 0.9 %
5-40 SYRINGE (ML) INJECTION PRN
Status: DISCONTINUED | OUTPATIENT
Start: 2025-02-28 | End: 2025-02-28 | Stop reason: HOSPADM

## 2025-02-28 RX ORDER — TOBRAMYCIN AND DEXAMETHASONE 3; 1 MG/ML; MG/ML
SUSPENSION/ DROPS OPHTHALMIC PRN
Status: DISCONTINUED | OUTPATIENT
Start: 2025-02-28 | End: 2025-02-28 | Stop reason: ALTCHOICE

## 2025-02-28 RX ORDER — SODIUM CHLORIDE, POTASSIUM CHLORIDE, CALCIUM CHLORIDE, MAGNESIUM CHLORIDE, SODIUM ACETATE, AND SODIUM CITRATE 6.4; .75; .48; .3; 3.9; 1.7 MG/ML; MG/ML; MG/ML; MG/ML; MG/ML; MG/ML
SOLUTION IRRIGATION PRN
Status: DISCONTINUED | OUTPATIENT
Start: 2025-02-28 | End: 2025-02-28 | Stop reason: ALTCHOICE

## 2025-02-28 RX ORDER — MEPERIDINE HYDROCHLORIDE 25 MG/ML
12.5 INJECTION INTRAMUSCULAR; INTRAVENOUS; SUBCUTANEOUS EVERY 5 MIN PRN
Status: DISCONTINUED | OUTPATIENT
Start: 2025-02-28 | End: 2025-02-28 | Stop reason: HOSPADM

## 2025-02-28 RX ORDER — LIDOCAINE HYDROCHLORIDE 10 MG/ML
0.3 INJECTION, SOLUTION EPIDURAL; INFILTRATION; INTRACAUDAL; PERINEURAL
Status: DISCONTINUED | OUTPATIENT
Start: 2025-02-28 | End: 2025-02-28 | Stop reason: HOSPADM

## 2025-02-28 RX ORDER — TETRACAINE HYDROCHLORIDE 5 MG/ML
SOLUTION OPHTHALMIC PRN
Status: DISCONTINUED | OUTPATIENT
Start: 2025-02-28 | End: 2025-02-28 | Stop reason: ALTCHOICE

## 2025-02-28 RX ORDER — CYCLOPENT/TROPIC/PHEN/KETR/WAT 1 %-1%-10%
0.3 DROPS (EA) OPHTHALMIC (EYE) EVERY 10 MIN PRN
Status: COMPLETED | OUTPATIENT
Start: 2025-02-28 | End: 2025-02-28

## 2025-02-28 RX ADMIN — TETRACAINE HYDROCHLORIDE 1 DROP: 5 SOLUTION OPHTHALMIC at 07:06

## 2025-02-28 RX ADMIN — LIDOCAINE HYDROCHLORIDE 60 MG: 20 INJECTION, SOLUTION INFILTRATION; PERINEURAL at 08:26

## 2025-02-28 RX ADMIN — Medication 0.3 ML: at 07:06

## 2025-02-28 RX ADMIN — PROPOFOL 150 MG: 10 INJECTION, EMULSION INTRAVENOUS at 08:26

## 2025-02-28 RX ADMIN — SODIUM CHLORIDE, POTASSIUM CHLORIDE, SODIUM LACTATE AND CALCIUM CHLORIDE: 600; 310; 30; 20 INJECTION, SOLUTION INTRAVENOUS at 08:23

## 2025-02-28 RX ADMIN — PROPOFOL 50 MG: 10 INJECTION, EMULSION INTRAVENOUS at 08:46

## 2025-02-28 RX ADMIN — BROMFENAC 1 DROP: 0.9 SOLUTION/ DROPS OPHTHALMIC at 06:54

## 2025-02-28 RX ADMIN — Medication 0.3 ML: at 07:07

## 2025-02-28 RX ADMIN — TETRACAINE HYDROCHLORIDE 1 DROP: 5 SOLUTION OPHTHALMIC at 07:22

## 2025-02-28 RX ADMIN — TETRACAINE HYDROCHLORIDE 1 DROP: 5 SOLUTION OPHTHALMIC at 06:54

## 2025-02-28 RX ADMIN — Medication 0.3 ML: at 06:54

## 2025-02-28 RX ADMIN — PHENYLEPHRINE HYDROCHLORIDE 100 MCG: 10 INJECTION INTRAVENOUS at 08:45

## 2025-02-28 ASSESSMENT — PAIN - FUNCTIONAL ASSESSMENT
PAIN_FUNCTIONAL_ASSESSMENT: ACTIVITIES ARE NOT PREVENTED
PAIN_FUNCTIONAL_ASSESSMENT: 0-10
PAIN_FUNCTIONAL_ASSESSMENT: 0-10

## 2025-02-28 ASSESSMENT — PAIN SCALES - GENERAL
PAINLEVEL_OUTOF10: 0

## 2025-02-28 NOTE — H&P
Surgical Service History and Physical    CHIEF COMPLAINT:   Decreased Vision and Glare    Reason for Admission:  Cataract Surgery    History Obtained From:  Patient    HISTORY OF PRESENT ILLNESS:  Pt has noticed painless progressive loss of vision and is experiencing functional difficulty.          Past Medical History:        Diagnosis Date    CAD (coronary artery disease)     CKD (chronic kidney disease)     Hyperlipidemia     Hypertension     NSTEMI (non-ST elevated myocardial infarction) (HCC)     Polymyalgia rheumatica     Rupture of bowel (HCC)     had a temporary colostomy       Past Surgical History:        Procedure Laterality Date    ABDOMEN SURGERY      CATARACT REMOVAL WITH IMPLANT Right 02/07/2025    CORONARY ANGIOPLASTY WITH STENT PLACEMENT      EYE SURGERY Right 2/7/2025    PHACO EMULSIFICATION OF CATARACT WITH INTRAOCULAR LENS IMPLANT RIGHT EYE performed by Kodi Mcfadden MD at [facility] OR    HYSTERECTOMY (CERVIX STATUS UNKNOWN)         Allergies:  Patient has no known allergies.    Prior to Admission medications    Medication Sig Start Date End Date Taking? Authorizing Provider   omeprazole (PRILOSEC) 20 MG delayed release capsule Take 1 capsule by mouth daily   Yes Eduardo Lee MD   spironolactone (ALDACTONE) 25 MG tablet Take 1 tablet by mouth daily   Yes Eduardo Lee MD   Multiple Vitamin (MULTI-VITAMIN) TABS Take 1 tablet by mouth daily   Yes Eduardo Lee MD   psyllium (KONSYL) 28.3 % PACK Take 1 packet by mouth daily   Yes ProviderEduardo MD   Bacillus Coagulans-Inulin (PROBIOTIC) 1-250 BILLION-MG CAPS Take 1 capsule by mouth daily   Yes Eduardo Lee MD   rosuvastatin (CRESTOR) 40 MG tablet Take 1 tablet by mouth every evening   Yes Eduardo Lee MD   ezetimibe (ZETIA) 10 MG tablet Take 1 tablet by mouth daily   Yes Eduardo Lee MD   aspirin 81 MG tablet Take 1 tablet by mouth daily 2/13/18  Yes Eduardo Lee MD   traZODone  (DESYREL) 100 MG tablet TAKE 1 TABLET BY MOUTH AT  BEDTIME 18  Yes Eduardo Lee MD   methotrexate (RHEUMATREX) 2.5 MG chemo tablet Take 5 tablets by mouth once a week 25   Eduardo Lee MD   revefenacin 175 MCG/3ML SOLN Inhale 75 mcg into the lungs daily  Patient not taking: Reported on 2025 3/25/24   Eduardo Lee MD   guaiFENesin (MUCINEX) 600 MG extended release tablet Take 1 tablet by mouth 2 times daily 23   Costa Noel MD   budesonide-formoterol (SYMBICORT) 160-4.5 MCG/ACT AERO Inhale 2 puffs into the lungs 2 times daily 23  Costa Noel MD   isosorbide mononitrate (IMDUR) 30 MG extended release tablet Take 1 tablet by mouth  Patient not taking: Reported on 2025   Eduardo Lee MD   metoprolol succinate (TOPROL XL) 25 MG extended release tablet Take 1 tablet by mouth  Patient not taking: Reported on 2025 3/16/18   Eduardo Lee MD         REVIEW OF SYSTEMS:    CONSTITUTIONAL:  negative  EYES: negative  HEENT:  negative  RESPIRATORY:  negative  CARDIOVASCULAR:  negative  MUSCULOSKELETAL:  negative  NEUROLOGICAL:  negative    PHYSICAL EXAM:    VITALS:  /72   Pulse 66   Temp 97.8 °F (36.6 °C) (Temporal)   Resp 16   Ht 1.676 m (5' 6\")   Wt 72.1 kg (159 lb)   SpO2 96%   BMI 25.66 kg/m²   TEMPERATURE:  Current - Temp: 97.8 °F (36.6 °C); Max - Temp  Av.8 °F (36.6 °C)  Min: 97.8 °F (36.6 °C)  Max: 97.8 °F (36.6 °C)  RESPIRATIONS RANGE: Resp  Av  Min: 16  Max: 16  PULSE RANGE: Pulse  Av  Min: 66  Max: 66  BLOOD PRESSURE RANGE:  Systolic (24hrs), Av , Min:131 , Max:131   ; Diastolic (24hrs), Av, Min:72, Max:72    CONSTITUTIONAL:  awake, alert, cooperative, no apparent distress, and appears stated age  EYES:  Lids and lashes normal, pupils equal, round and reactive to light, extra ocular muscles intact, sclera clear, conjunctiva normal  ENT:  Normocephalic, without obvious

## 2025-02-28 NOTE — OP NOTE
OPERATIVE NOTE    Patient Name   Date of Birth Age  MRNEdson Hamilton   1955   70 y.o.  [id number]       Surgeon        Surgery Date  Kodi Mcfadden MD        2/28/2025       Preoperative Diagnosis:  Nuclear Sclerotic Cataract left eye    Postoperative Diagnosis: Nuclear Sclerotic Cataract left eye    Operative Procedure: Phacoemulsification/ Posterior Chamber Intraocular Lens Implantation          Anesthesia:   General/LMA with Topical Tetracaine    Details of Procedure:    The patient was brought to the operating room on the operative stretcher in the supine position with the head resting in the head rest.  After appropriate anesthesia monitoring devices were applied, IV sedation was carried out per the anesthesia service.  The LMA device was introduced and general anesthesia was induced by the anesthesia service.    The operative eye was prepped and draped in the usual sterile fashion. Tobradex drops and Tetracaine drops were administered.  A wire lid speculum was then inserted into the operative eye.  A paracentesis was then performed using a 15 degree supersharp blade to enter the globe at the limbus, and a .12 mm colibri forceps was used to stabilize the globe.  Viscoat was then instilled into the anterior chamber.  A temporal clear cornea groove was then created using the 15 degree supersharp blade.  The cornea was then entered using the Moreno 2.4 mm clearcut keratome blade.  The capsulotomy was then performed using a cystotome, and the capsulorrhexis was completed using uttrata forceps.  The nucleus of the cataract was then hydrodissected and hydrodelineated using sterile BSS on a 27 gauge cannula.  The nucleus of the cataract was then removed using the phacoemilsification/aspiration device.  This was performed in a bimanual/CHOP technique.  The remaining cortex was then removed using the irrigation/aspiration device.  The posterior capsule was polished using the I/A device with CAP/VAC settings.

## 2025-02-28 NOTE — DISCHARGE INSTRUCTIONS
General Instructions After Surgery  (For One Week)    Kodi Mcfadden M.D.  Office [phone]  Office  [phone]    1. Tobradex eye drops: one drop every two hours while awake start at 11:00 AM       Continue one drop every two hours until seen in office at 1:05PM today 2/28/25    2. Bring all eye medications to the office with you today/tomorrow.    3. It is important to remember not to bend your head below your waist (you can squat or bend down on one knee, keeping your head upright.)    4. DO NOT LIFT objects heavier than 10 pounds, which is equal to a gallon of milk.    5. You can wash your face, but avoid the area around your operated eye. We recommend a partial bath today and tomorrow. When you resume normal bathing, be sure to turn your back in the shower avoiding water and shampoo from running into your operated eye. Avoid washing your hair for the first three days.    6. Lie on the un operated side while sleeping, or on your back.    7. PROTECT YOUR OPERATED EYE Wear your shield at bedtime at all times. During the day be sure to wear your glasses (or sunglasses provided) if you leave home. Remember your glasses will make your vision blurry in your operated eye, but will not in anyway damage your eye.    8. NEVER RUB OR PUSH ON YOUR EYE This is the worst thing that you can do while it is healing.    9. It is normal for the white part of the eye to appear red or bloodshot. DO NOT WORRY, this will clear in several weeks.    10. You should have NO PAIN after surgery. The eye may feel slightly irritated at first and (2) Tylenol may be used. If you have actual pain, increasing discomfort or sudden decrease in vision call us immediately.    11. Temporary \"floaters\" are not uncommon immediately after surgery, these will disappear. Your vision should gradually improve. Notify us immediately if you notice a shower of new floaters or flashes develop.    ANESTHESIA DISCHARGE INSTRUCTIONS    You are under the

## 2025-02-28 NOTE — ANESTHESIA POSTPROCEDURE EVALUATION
Department of Anesthesiology  Postprocedure Note    Patient: Sofia Hamilton  MRN: [id number]  YOB: 1955  Date of evaluation: 2/28/2025    Procedure Summary       Date: 02/28/25 Room / Location: [address]    Anesthesia Start: 0823 Anesthesia Stop: 0905    Procedure: PHACO EMULSIFICATION OF CATARACT WITH INTRAOCULAR LENS IMPLANT LEFT EYE (Left: Eye) Diagnosis:       Cataract of left eye, unspecified cataract type      (Cataract of left eye, unspecified cataract type [H26.9])    Surgeons: Kodi Mcfadden MD Responsible Provider: Tarun Garrison MD    Anesthesia Type: general ASA Status: 3            Anesthesia Type: No value filed.    Flora Phase I: Flora Score: 10    Flora Phase II: Flora Score: 10    Anesthesia Post Evaluation    Patient location during evaluation: PACU  Patient participation: complete - patient participated  Level of consciousness: awake and alert  Airway patency: patent  Nausea & Vomiting: no nausea and no vomiting  Cardiovascular status: blood pressure returned to baseline  Respiratory status: acceptable  Hydration status: euvolemic  Comments: VSS on transfer to phase 2 recovery.  No anesthetic complications.  Pain management: adequate    No notable events documented.

## 2025-02-28 NOTE — ANESTHESIA PRE PROCEDURE
Department of Anesthesiology  Preprocedure Note       Name:  Sofia Hamilton   Age:  70 y.o.  :  1955                                          MRN:  [id number]         Date:  2025      Surgeon: Surgeon(s):  Kodi Mcfadden MD    Procedure: Procedure(s):  PHACO EMULSIFICATION OF CATARACT WITH INTRAOCULAR LENS IMPLANT LEFT EYE    Medications prior to admission:   Prior to Admission medications    Medication Sig Start Date End Date Taking? Authorizing Provider   omeprazole (PRILOSEC) 20 MG delayed release capsule Take 1 capsule by mouth daily   Yes Eduardo Lee MD   spironolactone (ALDACTONE) 25 MG tablet Take 1 tablet by mouth daily   Yes Provider, MD Eduardo   Multiple Vitamin (MULTI-VITAMIN) TABS Take 1 tablet by mouth daily   Yes Eduardo Lee MD   psyllium (KONSYL) 28.3 % PACK Take 1 packet by mouth daily   Yes Eduardo Lee MD   Bacillus Coagulans-Inulin (PROBIOTIC) 1-250 BILLION-MG CAPS Take 1 capsule by mouth daily   Yes Eduardo Lee MD   rosuvastatin (CRESTOR) 40 MG tablet Take 1 tablet by mouth every evening   Yes Eduadro Lee MD   ezetimibe (ZETIA) 10 MG tablet Take 1 tablet by mouth daily   Yes Provider, MD Eduardo   aspirin 81 MG tablet Take 1 tablet by mouth daily 18  Yes Eduardo Lee MD   traZODone (DESYREL) 100 MG tablet TAKE 1 TABLET BY MOUTH AT  BEDTIME 18  Yes ProviderEduardo MD   methotrexate (RHEUMATREX) 2.5 MG chemo tablet Take 5 tablets by mouth once a week 25   Eduardo Lee MD   revefenacin 175 MCG/3ML SOLN Inhale 75 mcg into the lungs daily  Patient not taking: Reported on 2025 3/25/24   ProviderEduardo MD   guaiFENesin (MUCINEX) 600 MG extended release tablet Take 1 tablet by mouth 2 times daily 23   Costa Noel MD   budesonide-formoterol (SYMBICORT) 160-4.5 MCG/ACT AERO Inhale 2 puffs into the lungs 2 times daily 23  Costa Noel MD   isosorbide

## (undated) DEVICE — Device

## (undated) DEVICE — PREP SOL PVP IODINE 4%  4 OZ/BTL

## (undated) DEVICE — NEEDLE HYPO 25GA L1.5IN BLU POLYPR HUB S STL REG BVL STR

## (undated) DEVICE — SURGICAL PROCEDURE PACK EYE CLERMONT

## (undated) DEVICE — GLOVE ORANGE PI 8   MSG9080

## (undated) DEVICE — 6 ML SYRINGE LUER-LOCK TIP: Brand: MONOJECT